# Patient Record
Sex: FEMALE | Race: OTHER
[De-identification: names, ages, dates, MRNs, and addresses within clinical notes are randomized per-mention and may not be internally consistent; named-entity substitution may affect disease eponyms.]

---

## 2017-04-24 ENCOUNTER — APPOINTMENT (OUTPATIENT)
Dept: RADIOLOGY | Facility: CLINIC | Age: 52
End: 2017-04-24

## 2017-04-24 ENCOUNTER — OUTPATIENT (OUTPATIENT)
Dept: OUTPATIENT SERVICES | Facility: HOSPITAL | Age: 52
LOS: 1 days | End: 2017-04-24
Payer: COMMERCIAL

## 2017-04-24 DIAGNOSIS — Z00.8 ENCOUNTER FOR OTHER GENERAL EXAMINATION: ICD-10-CM

## 2017-04-24 PROCEDURE — 77080 DXA BONE DENSITY AXIAL: CPT

## 2017-07-01 ENCOUNTER — OUTPATIENT (OUTPATIENT)
Dept: OUTPATIENT SERVICES | Facility: HOSPITAL | Age: 52
LOS: 1 days | End: 2017-07-01
Payer: MEDICAID

## 2017-07-01 PROCEDURE — G9001: CPT

## 2017-07-27 ENCOUNTER — INPATIENT (INPATIENT)
Facility: HOSPITAL | Age: 52
LOS: 3 days | Discharge: HOME | DRG: 316 | End: 2017-07-31
Attending: THORACIC SURGERY (CARDIOTHORACIC VASCULAR SURGERY) | Admitting: THORACIC SURGERY (CARDIOTHORACIC VASCULAR SURGERY)
Payer: COMMERCIAL

## 2017-07-27 VITALS
SYSTOLIC BLOOD PRESSURE: 139 MMHG | DIASTOLIC BLOOD PRESSURE: 91 MMHG | HEIGHT: 58 IN | WEIGHT: 104.94 LBS | TEMPERATURE: 98 F | RESPIRATION RATE: 17 BRPM | OXYGEN SATURATION: 97 % | HEART RATE: 88 BPM

## 2017-07-27 DIAGNOSIS — I31.3 PERICARDIAL EFFUSION (NONINFLAMMATORY): ICD-10-CM

## 2017-07-27 LAB
ALBUMIN SERPL ELPH-MCNC: 4 G/DL — SIGNIFICANT CHANGE UP (ref 3.3–5)
ALBUMIN SERPL ELPH-MCNC: 4.5 G/DL — SIGNIFICANT CHANGE UP (ref 3.3–5)
ALP SERPL-CCNC: 34 U/L — LOW (ref 40–120)
ALP SERPL-CCNC: 36 U/L — SIGNIFICANT CHANGE UP (ref 30–120)
ALT FLD-CCNC: 14 U/L RC — SIGNIFICANT CHANGE UP (ref 10–45)
ALT FLD-CCNC: 17 U/L DA — SIGNIFICANT CHANGE UP (ref 10–60)
ANION GAP SERPL CALC-SCNC: 10 MMOL/L — SIGNIFICANT CHANGE UP (ref 5–17)
ANION GAP SERPL CALC-SCNC: 14 MMOL/L — SIGNIFICANT CHANGE UP (ref 5–17)
APPEARANCE UR: CLEAR — SIGNIFICANT CHANGE UP
APPEARANCE UR: CLEAR — SIGNIFICANT CHANGE UP
APTT BLD: 32 SEC — SIGNIFICANT CHANGE UP (ref 27.5–37.4)
AST SERPL-CCNC: 23 U/L — SIGNIFICANT CHANGE UP (ref 10–40)
AST SERPL-CCNC: 24 U/L — SIGNIFICANT CHANGE UP (ref 10–40)
BASOPHILS # BLD AUTO: 0.1 K/UL — SIGNIFICANT CHANGE UP (ref 0–0.2)
BASOPHILS NFR BLD AUTO: 0.9 % — SIGNIFICANT CHANGE UP (ref 0–2)
BILIRUB SERPL-MCNC: 0.3 MG/DL — SIGNIFICANT CHANGE UP (ref 0.2–1.2)
BILIRUB SERPL-MCNC: 0.3 MG/DL — SIGNIFICANT CHANGE UP (ref 0.2–1.2)
BILIRUB UR-MCNC: NEGATIVE — SIGNIFICANT CHANGE UP
BILIRUB UR-MCNC: NEGATIVE — SIGNIFICANT CHANGE UP
BLD GP AB SCN SERPL QL: NEGATIVE — SIGNIFICANT CHANGE UP
BUN SERPL-MCNC: 20 MG/DL — SIGNIFICANT CHANGE UP (ref 7–23)
BUN SERPL-MCNC: 22 MG/DL — SIGNIFICANT CHANGE UP (ref 7–23)
CALCIUM SERPL-MCNC: 8.9 MG/DL — SIGNIFICANT CHANGE UP (ref 8.4–10.5)
CALCIUM SERPL-MCNC: 9.2 MG/DL — SIGNIFICANT CHANGE UP (ref 8.4–10.5)
CHLORIDE SERPL-SCNC: 106 MMOL/L — SIGNIFICANT CHANGE UP (ref 96–108)
CHLORIDE SERPL-SCNC: 107 MMOL/L — SIGNIFICANT CHANGE UP (ref 96–108)
CK MB CFR SERPL CALC: 0.8 NG/ML — SIGNIFICANT CHANGE UP (ref 0–3.6)
CK SERPL-CCNC: 73 U/L — SIGNIFICANT CHANGE UP (ref 26–192)
CO2 SERPL-SCNC: 23 MMOL/L — SIGNIFICANT CHANGE UP (ref 22–31)
CO2 SERPL-SCNC: 25 MMOL/L — SIGNIFICANT CHANGE UP (ref 22–31)
COLOR SPEC: COLORLESS — SIGNIFICANT CHANGE UP
COLOR SPEC: YELLOW — SIGNIFICANT CHANGE UP
CREAT SERPL-MCNC: 0.89 MG/DL — SIGNIFICANT CHANGE UP (ref 0.5–1.3)
CREAT SERPL-MCNC: 0.93 MG/DL — SIGNIFICANT CHANGE UP (ref 0.5–1.3)
DIFF PNL FLD: NEGATIVE — SIGNIFICANT CHANGE UP
DIFF PNL FLD: NEGATIVE — SIGNIFICANT CHANGE UP
EOSINOPHIL # BLD AUTO: 0.1 K/UL — SIGNIFICANT CHANGE UP (ref 0–0.5)
EOSINOPHIL NFR BLD AUTO: 1.9 % — SIGNIFICANT CHANGE UP (ref 0–6)
GLUCOSE SERPL-MCNC: 109 MG/DL — HIGH (ref 70–99)
GLUCOSE SERPL-MCNC: 90 MG/DL — SIGNIFICANT CHANGE UP (ref 70–99)
GLUCOSE UR QL: NEGATIVE MG/DL — SIGNIFICANT CHANGE UP
GLUCOSE UR QL: NEGATIVE — SIGNIFICANT CHANGE UP
HCT VFR BLD CALC: 34.3 % — LOW (ref 34.5–45)
HCT VFR BLD CALC: 35.1 % — SIGNIFICANT CHANGE UP (ref 34.5–45)
HGB BLD-MCNC: 10.8 G/DL — LOW (ref 11.5–15.5)
HGB BLD-MCNC: 11.3 G/DL — LOW (ref 11.5–15.5)
INR BLD: 1.14 RATIO — SIGNIFICANT CHANGE UP (ref 0.88–1.16)
INR BLD: 1.17 RATIO — HIGH (ref 0.88–1.16)
KETONES UR-MCNC: NEGATIVE — SIGNIFICANT CHANGE UP
KETONES UR-MCNC: NEGATIVE — SIGNIFICANT CHANGE UP
LEUKOCYTE ESTERASE UR-ACNC: ABNORMAL
LEUKOCYTE ESTERASE UR-ACNC: NEGATIVE — SIGNIFICANT CHANGE UP
LYMPHOCYTES # BLD AUTO: 2.1 K/UL — SIGNIFICANT CHANGE UP (ref 1–3.3)
LYMPHOCYTES # BLD AUTO: 35.7 % — SIGNIFICANT CHANGE UP (ref 13–44)
MCHC RBC-ENTMCNC: 25.8 PG — LOW (ref 27–34)
MCHC RBC-ENTMCNC: 28.1 PG — SIGNIFICANT CHANGE UP (ref 27–34)
MCHC RBC-ENTMCNC: 30.7 GM/DL — LOW (ref 32–36)
MCHC RBC-ENTMCNC: 32.9 GM/DL — SIGNIFICANT CHANGE UP (ref 32–36)
MCV RBC AUTO: 84.2 FL — SIGNIFICANT CHANGE UP (ref 80–100)
MCV RBC AUTO: 85.4 FL — SIGNIFICANT CHANGE UP (ref 80–100)
MONOCYTES # BLD AUTO: 0.4 K/UL — SIGNIFICANT CHANGE UP (ref 0–0.9)
MONOCYTES NFR BLD AUTO: 6.6 % — SIGNIFICANT CHANGE UP (ref 2–14)
NEUTROPHILS # BLD AUTO: 3.3 K/UL — SIGNIFICANT CHANGE UP (ref 1.8–7.4)
NEUTROPHILS NFR BLD AUTO: 54.9 % — SIGNIFICANT CHANGE UP (ref 43–77)
NITRITE UR-MCNC: NEGATIVE — SIGNIFICANT CHANGE UP
NITRITE UR-MCNC: NEGATIVE — SIGNIFICANT CHANGE UP
NT-PROBNP SERPL-SCNC: 24 PG/ML — SIGNIFICANT CHANGE UP (ref 0–300)
NT-PROBNP SERPL-SCNC: 29 PG/ML — SIGNIFICANT CHANGE UP (ref 0–125)
PA ADP PRP-ACNC: 234 PRU — SIGNIFICANT CHANGE UP (ref 194–417)
PH UR: 7 — SIGNIFICANT CHANGE UP (ref 5–8)
PH UR: 7 — SIGNIFICANT CHANGE UP (ref 5–8)
PLATELET # BLD AUTO: 281 K/UL — SIGNIFICANT CHANGE UP (ref 150–400)
PLATELET # BLD AUTO: 334 K/UL — SIGNIFICANT CHANGE UP (ref 150–400)
POTASSIUM SERPL-MCNC: 3.7 MMOL/L — SIGNIFICANT CHANGE UP (ref 3.5–5.3)
POTASSIUM SERPL-MCNC: 3.9 MMOL/L — SIGNIFICANT CHANGE UP (ref 3.5–5.3)
POTASSIUM SERPL-SCNC: 3.7 MMOL/L — SIGNIFICANT CHANGE UP (ref 3.5–5.3)
POTASSIUM SERPL-SCNC: 3.9 MMOL/L — SIGNIFICANT CHANGE UP (ref 3.5–5.3)
PROT SERPL-MCNC: 7.4 G/DL — SIGNIFICANT CHANGE UP (ref 6–8.3)
PROT SERPL-MCNC: 7.5 G/DL — SIGNIFICANT CHANGE UP (ref 6–8.3)
PROT UR-MCNC: NEGATIVE MG/DL — SIGNIFICANT CHANGE UP
PROT UR-MCNC: NEGATIVE — SIGNIFICANT CHANGE UP
PROTHROM AB SERPL-ACNC: 12.3 SEC — SIGNIFICANT CHANGE UP (ref 9.8–12.7)
PROTHROM AB SERPL-ACNC: 12.8 SEC — HIGH (ref 9.8–12.7)
RBC # BLD: 4.02 M/UL — SIGNIFICANT CHANGE UP (ref 3.8–5.2)
RBC # BLD: 4.17 M/UL — SIGNIFICANT CHANGE UP (ref 3.8–5.2)
RBC # FLD: 12.7 % — SIGNIFICANT CHANGE UP (ref 10.3–14.5)
RBC # FLD: 13.1 % — SIGNIFICANT CHANGE UP (ref 10.3–14.5)
RH IG SCN BLD-IMP: POSITIVE — SIGNIFICANT CHANGE UP
SODIUM SERPL-SCNC: 142 MMOL/L — SIGNIFICANT CHANGE UP (ref 135–145)
SODIUM SERPL-SCNC: 143 MMOL/L — SIGNIFICANT CHANGE UP (ref 135–145)
SP GR SPEC: 1 — LOW (ref 1.01–1.02)
SP GR SPEC: 1 — LOW (ref 1.01–1.02)
TROPONIN I SERPL-MCNC: 0 NG/ML — LOW (ref 0.02–0.06)
UROBILINOGEN FLD QL: NEGATIVE MG/DL — SIGNIFICANT CHANGE UP
UROBILINOGEN FLD QL: NEGATIVE — SIGNIFICANT CHANGE UP
WBC # BLD: 5.5 K/UL — SIGNIFICANT CHANGE UP (ref 3.8–10.5)
WBC # BLD: 5.9 K/UL — SIGNIFICANT CHANGE UP (ref 3.8–10.5)
WBC # FLD AUTO: 5.5 K/UL — SIGNIFICANT CHANGE UP (ref 3.8–10.5)
WBC # FLD AUTO: 5.9 K/UL — SIGNIFICANT CHANGE UP (ref 3.8–10.5)

## 2017-07-27 PROCEDURE — 71020: CPT | Mod: 26

## 2017-07-27 PROCEDURE — 71250 CT THORAX DX C-: CPT | Mod: 26

## 2017-07-27 PROCEDURE — 71010: CPT | Mod: 26,59

## 2017-07-27 RX ORDER — PANTOPRAZOLE SODIUM 20 MG/1
40 TABLET, DELAYED RELEASE ORAL
Qty: 0 | Refills: 0 | Status: DISCONTINUED | OUTPATIENT
Start: 2017-07-27 | End: 2017-07-31

## 2017-07-27 RX ORDER — SODIUM CHLORIDE 9 MG/ML
3 INJECTION INTRAMUSCULAR; INTRAVENOUS; SUBCUTANEOUS EVERY 8 HOURS
Qty: 0 | Refills: 0 | Status: DISCONTINUED | OUTPATIENT
Start: 2017-07-27 | End: 2017-07-31

## 2017-07-27 RX ADMIN — SODIUM CHLORIDE 3 MILLILITER(S): 9 INJECTION INTRAMUSCULAR; INTRAVENOUS; SUBCUTANEOUS at 23:13

## 2017-07-27 RX ADMIN — PANTOPRAZOLE SODIUM 40 MILLIGRAM(S): 20 TABLET, DELAYED RELEASE ORAL at 23:30

## 2017-07-27 NOTE — ED ADULT NURSE REASSESSMENT NOTE - NS ED NURSE REASSESS COMMENT FT1
1830- pt to be transferred to [place]. report given to rogelio gonzalez -CTU
1730-Pt resting quietly on stretcher with daughter bedside. Labs drawn & sent. EKG done

## 2017-07-27 NOTE — ED PROVIDER NOTE - PROGRESS NOTE DETAILS
chest xray Cardiac enlargement. The shape of the heart suggests the   presence of a pericardial effusion. Advise echocardiogram bedside sono done by Dr. Katie Thomas shows large pericardial effusion with collapsing of the RV indicative of cardiac tamponade. Dr. Manley wants pt transferred to [facility]. discussed case with Dr. Gerry Yanes from [place] [facility] surgery, recommend transfer to CTU bedside sono done by Dr. Katie Manley shows large pericardial effusion with collapsing of the RV indicative of cardiac tamponade. Dr. Manley wants pt transferred to [facility].

## 2017-07-27 NOTE — ED PROVIDER NOTE - OBJECTIVE STATEMENT
51 year old female with a PMhx of DM, HTN c/o lingering cough since July 8th. Pt states she was placed on Cefdinir by PMD 1 week ago.  Pt admits that the cough has not gotten any better.  She admits to a productive cough with clear mucus and SOB with exertion.  PT states she had a CT abd/pel done with her GI 1 month ago prior to the cough and fluid was noted in the lung.  She has been taking OTC cough suppressants with minimal relief.  Denies fever, chills, congestion, ear pain, sore throat, chest pain 51 year old female with a PMhx of DM, HTN c/o lingering cough since July 8th. Pt states she was placed on Cefdinir by PMD 1 week ago.  Pt admits that the cough has not gotten any better.  She admits to a productive cough with clear mucus and SOB with exertion.  States she is unable to walk down the street without becoming SOB.  PT states she had a CT abd/pel done with her GI 1 month ago prior to the cough and fluid was noted in the lung.  She has been taking OTC cough suppressants with minimal relief.  Denies fever, chills, congestion, ear pain, sore throat, chest pain

## 2017-07-27 NOTE — ED ADULT NURSE NOTE - PMH
Anxiety    Essential hypertension    Type 2 diabetes mellitus without complication, without long-term current use of insulin

## 2017-07-27 NOTE — ED ADULT TRIAGE NOTE - CHIEF COMPLAINT QUOTE
" She been coughing a lot for 2 weeks now. It is getting worse " Pt seen by PMD prescribed  cefdinir antibiotic

## 2017-07-27 NOTE — CONSULT NOTE ADULT - ASSESSMENT
50y/o from [facility]. Seen at [facility] ER. Daughter at bedside.  History HTN, borderline DM, high cholesterol, anxiety/depression, gastric reflux.  Sees GI who did a recent abdominal CAT scan that also revealed a pericardial effusion.    Seen starting about 7/8/17 with cough and clear sputum for which she was treated with antibiotics.   She has also had increasing shortness of breath with exertion, even at rest with lying down.  She denies recent foreign travel or fevers.  She does admit to some chest pain when she breathes in.    Preliminary STAT Echocardiogram compatible with huge pericardial effusion and some collapse of the RV.    Plan:  - Will emergently transfer the patient to [facility] with Dr. Gerry Yanes for further care.  - Diagnosis and prognosis fully discussed with patient and daughter.

## 2017-07-27 NOTE — ED ADULT NURSE NOTE - OBJECTIVE STATEMENT
Amb to ED with her daughter. Pt has had a cough for past 2 wks. Was treated with antibiotics but concerned because she still has a dry cough7 clogged ears. Color good. Skin warm & dry to touch. lungs clear

## 2017-07-27 NOTE — ED PROVIDER NOTE - CPE EDP HEAD NORM PED
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    Head atraumatic, normal cephalic shape.

## 2017-07-27 NOTE — ED PROVIDER NOTE - ATTENDING CONTRIBUTION TO CARE
50 yo female c/o progressive worsening shortness of breath x 2-3 weeks, worse on exertion, unable to walk more than 1 block without getting short of breath.  Lungs CTA b/l, RRR, xray shows cardiomegaly with pericardial effusion, ekg shows low voltage, Dr. Katie Manley consulted, echo ordered shows tamponade with RV collapse.  Discussed case with CT surgery Dr. Yanes, will transfer to [facility].

## 2017-07-27 NOTE — CONSULT NOTE ADULT - SUBJECTIVE AND OBJECTIVE BOX
CARDIOLOGY CONSULT NOTE    Patient is a 51y Female with a known history of :    HPI:  · HPI Objective Statement: 51 year old female with a PMhx of DM, HTN c/o lingering cough since July 8th. Pt states she was placed on Cefdinir by PMD 1 week ago.  Pt admits that the cough has not gotten any better.  She admits to a productive cough with clear mucus and SOB with exertion.  States she is unable to walk down the street without becoming SOB.  PT states she had a CT abd/pel done with her GI 1 month ago prior to the cough and fluid was noted in the lung.  She has been taking OTC cough suppressants with minimal relief.  Denies fever, chills, congestion, ear pain, sore throat, chest pain    REVIEW OF SYSTEMS:    CONSTITUTIONAL: No fever, weight loss, or fatigue  EYES: No eye pain, visual disturbances, or discharge  ENMT:  No difficulty hearing, tinnitus, vertigo; No sinus or throat pain  NECK: No pain or stiffness  BREASTS: No pain, masses, or nipple discharge  RESPIRATORY: No wheezing, chills or hemoptysis.  CARDIOVASCULAR: No palpitations, dizziness, or leg swelling  GASTROINTESTINAL: No abdominal or epigastric pain. No nausea, vomiting, or hematemesis; No diarrhea or constipation. No melena or hematochezia.  GENITOURINARY: No dysuria, frequency, hematuria, or incontinence  NEUROLOGICAL: No headaches, memory loss, loss of strength, numbness, or tremors  SKIN: No itching, burning, rashes, or lesions   LYMPH NODES: No enlarged glands  ENDOCRINE: No heat or cold intolerance; No hair loss  MUSCULOSKELETAL: No joint pain or swelling; No muscle, back, or extremity pain  PSYCHIATRIC: No depression, anxiety, mood swings, or difficulty sleeping  HEME/LYMPH: No easy bruising, or bleeding gums  ALLERGY AND IMMUNOLOGIC: No hives or eczema    MEDICATIONS  (STANDING):    MEDICATIONS  (PRN):      ALLERGIES: No Known Allergies      FAMILY HISTORY: None      Social History:  Alochol:   Smoking:   Drug Use:   Marital Status:     I&O's Detail      PHYSICAL EXAMINATION:  -----------------------------  T(C): 36.9 (07-27-17 @ 18:00), Max: 36.9 (07-27-17 @ 15:36)  HR: 78 (07-27-17 @ 18:00) (78 - 88)  BP: 136/98 (07-27-17 @ 18:00) (136/98 - 139/91)  RR: 16 (07-27-17 @ 18:00) (16 - 17)  SpO2: 96% (07-27-17 @ 18:00) (96% - 97%)  Wt(kg): --    Height (cm): 147.32 (07-27 @ 15:36)  Weight (kg): 47.6 (07-27 @ 15:36)  BMI (kg/m2): 21.9 (07-27 @ 15:36)  BSA (m2): 1.38 (07-27 @ 15:36)    Constitutional: well developed, normal appearance, well groomed, well nourished, no deformities and no acute distress.   Eyes: the conjunctiva exhibited no abnormalities and the eyelids demonstrated no xanthelasmas.   HEENT: normal oral mucosa, no oral pallor and no oral cyanosis.   Neck: normal jugular venous A waves present, normal jugular venous V waves present and no jugular venous naidu A waves.   Pulmonary: no respiratory distress, normal respiratory rhythm and effort, no accessory muscle use and lungs were clear to auscultation bilaterally.   Cardiovascular: heart rate and rhythm were normal, normal S1 and S2 and no murmur, gallop, rub, heave or thrill are present.   Abdomen: soft, non-tender, no hepato-splenomegaly and no abdominal mass palpated.   Musculoskeletal: the gait could not be assessed..   Extremities: no clubbing of the fingernails, no localized cyanosis, no petechial hemorrhages and no ischemic changes.   Skin: normal skin color and pigmentation, no rash, no venous stasis, no skin lesions, no skin ulcer and no xanthoma was observed.   Psychiatric: oriented to person, place, and time, the affect was normal, the mood was normal and not feeling anxious.     LABS:   --------  07-27    142  |  107  |  22  ----------------------------<  90  3.9   |  25  |  0.89    Ca    9.2      27 Jul 2017 17:55                           10.8   5.5   )-----------( 334      ( 27 Jul 2017 17:55 )             35.1         PO2 R/A-96%.            RADIOLOGY:  -----------------    < from: Xray Chest 2 Views PA/Lat (07.27.17 @ 16:51) >    EXAM:  CHEST PA & LAT                                  PROCEDURE DATE:  07/27/2017          INTERPRETATION:  Clinical information: Cough    2 views, frontal and lateral    Comparison study dated 8/31/2015.    Marked cardiac enlargement. The shape of the heart suggests the presence   of a pericardial effusion. Advise echocardiogram.      No lobar consolidation vascular congestion or effusion. Disc atelectasis   left upper lobe. Mediastinal contours intact. No acute osseous   abnormalities.      IMPRESSION: Cardiac enlargement. The shape of the heart suggests the   presence of a pericardial effusion. Advise echocardiogram.                  SHARON DENNISON M.D.,ATTENDING RADIOLOGIST  This document has been electronically signed. Jul 27 2017  4:50PM                < end of copied text >      ECG: NSR, ?low voltage.

## 2017-07-27 NOTE — ED PROVIDER NOTE - CHPI ED SYMPTOMS NEG
no decreased eating/drinking/no dizziness/no chills/no vomiting/no tingling/no numbness/no weakness/no nausea/no pain

## 2017-07-27 NOTE — ED PROVIDER NOTE - CRITICAL CARE PROVIDED
interpretation of diagnostic studies/additional history taking/documentation/direct patient care (not related to procedure)/consultation with other physicians/consult w/ pt's family directly relating to pts condition

## 2017-07-28 ENCOUNTER — RESULT REVIEW (OUTPATIENT)
Age: 52
End: 2017-07-28

## 2017-07-28 DIAGNOSIS — I31.3 PERICARDIAL EFFUSION (NONINFLAMMATORY): ICD-10-CM

## 2017-07-28 DIAGNOSIS — R69 ILLNESS, UNSPECIFIED: ICD-10-CM

## 2017-07-28 LAB
ALBUMIN FLD-MCNC: 4.2 G/DL — SIGNIFICANT CHANGE UP
B PERT IGG+IGM PNL SER: ABNORMAL
COLOR FLD: SIGNIFICANT CHANGE UP
EOSINOPHIL # FLD: 1 % — SIGNIFICANT CHANGE UP
FLUID INTAKE SUBSTANCE CLASS: SIGNIFICANT CHANGE UP
FLUID SEGMENTED GRANULOCYTES: 3 % — SIGNIFICANT CHANGE UP
GAS PNL BLDA: SIGNIFICANT CHANGE UP
GAS PNL BLDA: SIGNIFICANT CHANGE UP
GLUCOSE FLD-MCNC: 23 MG/DL — SIGNIFICANT CHANGE UP
HBA1C BLD-MCNC: 5.8 % — HIGH (ref 4–5.6)
LDH SERPL L TO P-CCNC: 265 U/L — SIGNIFICANT CHANGE UP
LYMPHOCYTES # FLD: 73 % — SIGNIFICANT CHANGE UP
MESOTHL CELL # FLD: 1 % — SIGNIFICANT CHANGE UP
MONOS+MACROS # FLD: 22 % — SIGNIFICANT CHANGE UP
MRSA PCR RESULT.: SIGNIFICANT CHANGE UP
NRBC # FLD: 1 % — HIGH (ref 0–0)
PH FLD: 7.5 — SIGNIFICANT CHANGE UP
PROT FLD-MCNC: 6.5 G/DL — SIGNIFICANT CHANGE UP
RCV VOL RI: HIGH /UL (ref 0–5)
S AUREUS DNA NOSE QL NAA+PROBE: SIGNIFICANT CHANGE UP
SPECIMEN SOURCE FLD: SIGNIFICANT CHANGE UP
T3 SERPL-MCNC: 112 NG/DL — SIGNIFICANT CHANGE UP (ref 80–200)
T4 AB SER-ACNC: 8.3 UG/DL — SIGNIFICANT CHANGE UP (ref 4.6–12)
TOTAL NUCLEATED CELL COUNT, BODY FLUID: 1778 /UL — HIGH (ref 0–5)
TSH SERPL-MCNC: 2.42 UIU/ML — SIGNIFICANT CHANGE UP (ref 0.27–4.2)
TUBE TYPE: SIGNIFICANT CHANGE UP

## 2017-07-28 PROCEDURE — 77012 CT SCAN FOR NEEDLE BIOPSY: CPT | Mod: 26

## 2017-07-28 PROCEDURE — 88341 IMHCHEM/IMCYTCHM EA ADD ANTB: CPT | Mod: 26

## 2017-07-28 PROCEDURE — 93010 ELECTROCARDIOGRAM REPORT: CPT

## 2017-07-28 PROCEDURE — 88112 CYTOPATH CELL ENHANCE TECH: CPT | Mod: 26

## 2017-07-28 PROCEDURE — 88305 TISSUE EXAM BY PATHOLOGIST: CPT | Mod: 26

## 2017-07-28 PROCEDURE — 33015: CPT

## 2017-07-28 PROCEDURE — 88342 IMHCHEM/IMCYTCHM 1ST ANTB: CPT | Mod: 26

## 2017-07-28 RX ORDER — SODIUM CHLORIDE 9 MG/ML
1000 INJECTION, SOLUTION INTRAVENOUS
Qty: 0 | Refills: 0 | Status: DISCONTINUED | OUTPATIENT
Start: 2017-07-28 | End: 2017-07-28

## 2017-07-28 RX ORDER — HYDROMORPHONE HYDROCHLORIDE 2 MG/ML
2 INJECTION INTRAMUSCULAR; INTRAVENOUS; SUBCUTANEOUS ONCE
Qty: 0 | Refills: 0 | Status: DISCONTINUED | OUTPATIENT
Start: 2017-07-28 | End: 2017-07-28

## 2017-07-28 RX ORDER — METFORMIN HYDROCHLORIDE 850 MG/1
1 TABLET ORAL
Qty: 0 | Refills: 0 | COMMUNITY

## 2017-07-28 RX ORDER — ALPRAZOLAM 0.25 MG
1 TABLET ORAL
Qty: 0 | Refills: 0 | COMMUNITY

## 2017-07-28 RX ORDER — FENOFIBRATE,MICRONIZED 130 MG
1 CAPSULE ORAL
Qty: 0 | Refills: 0 | COMMUNITY

## 2017-07-28 RX ORDER — PANTOPRAZOLE SODIUM 20 MG/1
1 TABLET, DELAYED RELEASE ORAL
Qty: 0 | Refills: 0 | COMMUNITY

## 2017-07-28 RX ORDER — ACETAMINOPHEN 500 MG
850 TABLET ORAL ONCE
Qty: 0 | Refills: 0 | Status: COMPLETED | OUTPATIENT
Start: 2017-07-28 | End: 2017-07-28

## 2017-07-28 RX ADMIN — HYDROMORPHONE HYDROCHLORIDE 2 MILLIGRAM(S): 2 INJECTION INTRAMUSCULAR; INTRAVENOUS; SUBCUTANEOUS at 20:48

## 2017-07-28 RX ADMIN — Medication 850 MILLIGRAM(S): at 23:49

## 2017-07-28 RX ADMIN — SODIUM CHLORIDE 3 MILLILITER(S): 9 INJECTION INTRAMUSCULAR; INTRAVENOUS; SUBCUTANEOUS at 21:59

## 2017-07-28 RX ADMIN — SODIUM CHLORIDE 3 MILLILITER(S): 9 INJECTION INTRAMUSCULAR; INTRAVENOUS; SUBCUTANEOUS at 14:04

## 2017-07-28 RX ADMIN — HYDROMORPHONE HYDROCHLORIDE 2 MILLIGRAM(S): 2 INJECTION INTRAMUSCULAR; INTRAVENOUS; SUBCUTANEOUS at 19:57

## 2017-07-28 RX ADMIN — SODIUM CHLORIDE 3 MILLILITER(S): 9 INJECTION INTRAMUSCULAR; INTRAVENOUS; SUBCUTANEOUS at 05:33

## 2017-07-28 RX ADMIN — Medication 340 MILLIGRAM(S): at 23:10

## 2017-07-28 NOTE — PROCEDURE NOTE - NSPROCDETAILS_GEN_ALL_CORE
connected to a pressurized flush line/all materials/supplies accounted for at end of procedure/location identified, draped/prepped, sterile technique used, needle inserted/introduced/sutured in place

## 2017-07-28 NOTE — H&P ADULT - PROBLEM SELECTOR PLAN 1
- monitor hemodynamics  - place arterial line, admit labs, cxr, ekg, ABG  - CT chest  - IR consult for possible drainage in AM

## 2017-07-28 NOTE — H&P ADULT - PMH
Anxiety    Essential hypertension    Gastroesophageal reflux disease, esophagitis presence not specified    Type 2 diabetes mellitus without complication, without long-term current use of insulin

## 2017-07-28 NOTE — H&P ADULT - GASTROINTESTINAL DETAILS
no masses palpable/no guarding/no rebound tenderness/soft/bowel sounds normal/no distention/no rigidity

## 2017-07-28 NOTE — PROGRESS NOTE ADULT - SUBJECTIVE AND OBJECTIVE BOX
VITAL SIGNS    Telemetry:  NSR 90    Vital Signs Last 24 Hrs  T(C): 36.9 (17 @ 12:00), Max: 36.9 (17 @ 15:36)  T(F): 98.4 (17 @ 12:00), Max: 98.5 (17 @ 15:36)  HR: 85 (17 @ 14:00) (78 - 101)  BP: 137/92 (17 @ 13:00) (133/92 - 164/100)  RR: 16 (17 @ 14:00) (16 - 34)  SpO2: 100% (17 @ 14:00) (94% - 100%)                    @ 07:01  -   @ 07:00  --------------------------------------------------------  IN: 0 mL / OUT: 500 mL / NET: -500 mL     @ 07:01  -   @ 15:07  --------------------------------------------------------  IN: 200 mL / OUT: 350 mL / NET: -150 mL          Daily Height in cm: 147.32 (2017 15:36)    Daily Weight in k.6 (2017 00:00)        CAPILLARY BLOOD GLUCOSE  109 (2017 00:00)  109 (2017 21:00)                  Coumadin    [ ] YES          x[  ]      NO         Reason:                               PHYSICAL EXAM        Neurology: alert and oriented x 3, nonfocal, no gross deficits    CV :  S1 S2 , RRR     Sternal Wound :  no    Lungs: cta    Abdomen: soft, nontender, nondistended, positive bowel sounds,    :           Voiding    Extremities:        - edema, negative calve tenderness,          sodium chloride 0.9% lock flush 3 milliLiter(s) IV Push every 8 hours  pantoprazole    Tablet 40 milliGRAM(s) Oral before breakfast  dextrose 5% + sodium chloride 0.9%. 1000 milliLiter(s) IV Continuous <Continuous>                              Physical Therapy Rec:   Home  [  ]   Home w/ PT  [  ]  Rehab  [  ]    Discussed with Cardiothoracic Team at AM rounds.

## 2017-07-28 NOTE — H&P ADULT - HISTORY OF PRESENT ILLNESS
51 year old female PMH of DM2, HTN transferred from [facility] where she was found to have a pericardial effusion on TTE. Pt c/o dry cough with occasional clear/white sputum x 3 weeks, fatigue and decreased exercise tolerance, also c/o chronic epigastric pain. Pt states she was placed on Cefdinir by PMD 1 week ago with no improvement in symptoms. Pt denies any fever, chills, N/V/D, recent travel, diaphoresis, orthopnea, paroxysmal nocturnal dyspnea. Admitted to CTU, currently hemodynamically stable, no complaints.

## 2017-07-28 NOTE — PROGRESS NOTE ADULT - SUBJECTIVE AND OBJECTIVE BOX
51 year old female PMH of DM2, HTN transferred from [facility] where she was found to have a pericardial effusion on TTE presented to IR for pericardial effusion drainage.     Allergies:No Known Allergies      PAST MEDICAL & SURGICAL HISTORY:  Gastroesophageal reflux disease, esophagitis presence not specified  Anxiety  Type 2 diabetes mellitus without complication, without long-term current use of insulin  Essential hypertension  No significant past surgical history        Pertinent labs:                      11.3   5.9   )-----------( 281      ( 27 Jul 2017 20:58 )             34.3   07-27    143  |  106  |  20  ----------------------------<  109<H>  3.7   |  23  |  0.93    Ca    8.9      27 Jul 2017 20:58    TPro  7.4  /  Alb  4.5  /  TBili  0.3  /  DBili  x   /  AST  23  /  ALT  14  /  AlkPhos  34<L>  07-27  PT/INR - ( 27 Jul 2017 20:58 )   PT: 12.3 sec;   INR: 1.14 ratio         PTT - ( 27 Jul 2017 20:58 )  PTT:32.0 sec    Consent: Procedure/risks/ Benefits explained. Informed consent obtained. Pt verbalizes understanding. 51 year old female PMH of DM2, HTN transferred from [facility] where she was found to have a pericardial effusion on TTE presented to IR for pericardial effusion drainage.     Allergies:No Known Allergies      PAST MEDICAL & SURGICAL HISTORY:  Gastroesophageal reflux disease, esophagitis presence not specified  Anxiety  Type 2 diabetes mellitus without complication, without long-term current use of insulin  Essential hypertension  No significant past surgical history        Pertinent labs:                      11.3   5.9   )-----------( 281      ( 27 Jul 2017 20:58 )             34.3   07-27    143  |  106  |  20  ----------------------------<  109<H>  3.7   |  23  |  0.93    Ca    8.9      27 Jul 2017 20:58    TPro  7.4  /  Alb  4.5  /  TBili  0.3  /  DBili  x   /  AST  23  /  ALT  14  /  AlkPhos  34<L>  07-27  PT/INR - ( 27 Jul 2017 20:58 )   PT: 12.3 sec;   INR: 1.14 ratio         PTT - ( 27 Jul 2017 20:58 )  PTT:32.0 sec    Pt refused  phone. Daughter translated for pt.  Consent: Procedure/risks/ Benefits explained. Informed consent obtained. Pt verbalizes understanding. 51 year old female PMH of DM2, HTN transferred from [facility] where she was found to have a pericardial effusion on TTE presented to IR for pericardial effusion drainage. Reports cough and SOB for 3 weeks and chest pain.    Allergies:No Known Allergies      PAST MEDICAL & SURGICAL HISTORY:  Gastroesophageal reflux disease, esophagitis presence not specified  Anxiety  Type 2 diabetes mellitus without complication, without long-term current use of insulin  Essential hypertension  No significant past surgical history        Pertinent labs:                      11.3   5.9   )-----------( 281      ( 27 Jul 2017 20:58 )             34.3   07-27    143  |  106  |  20  ----------------------------<  109<H>  3.7   |  23  |  0.93    Ca    8.9      27 Jul 2017 20:58    TPro  7.4  /  Alb  4.5  /  TBili  0.3  /  DBili  x   /  AST  23  /  ALT  14  /  AlkPhos  34<L>  07-27  PT/INR - ( 27 Jul 2017 20:58 )   PT: 12.3 sec;   INR: 1.14 ratio         PTT - ( 27 Jul 2017 20:58 )  PTT:32.0 sec    Pt refused  phone. Daughter translated for pt.  Consent: Procedure/risks/ Benefits explained. Informed consent obtained. Pt verbalizes understanding.

## 2017-07-28 NOTE — H&P ADULT - ASSESSMENT
52 y/o F presents from [facility] with reported pericardial effusion on TTE, c/o dry cough, hemodynamically stable.

## 2017-07-28 NOTE — PROGRESS NOTE ADULT - ASSESSMENT
51 year old female PMH of DM2, HTN transferred from [facility] where she was found to have a pericardial effusion on TTE. Pt c/o dry cough with occasional clear/white sputum x 3 weeks, fatigue and decreased exercise tolerance, also c/o chronic epigastric pain. Pt states she was placed on Cefdinir by PMD 1 week ago with no improvement in symptoms. . Admitted to CTU,  hemodynamically stable, no complaints.A line placed for monitoring  7/28 transferred to sdu  The pt has an duran.  She is awaiting drainage of a pericardial effusion in IR

## 2017-07-28 NOTE — PROGRESS NOTE ADULT - SUBJECTIVE AND OBJECTIVE BOX
Interventional Radiology Brief- Operative Note    Procedure: CT guided pericardial drainage    Operators: Joel    Anesthesia (type): 2% lidocaine local    Contrast: none    EBL: none    Findings/Follow up Plan of Care:CT guided pericardial drainage performed and insertion of an 8 Fr drainage catheter. Appx 1170cc dark red fluid drained. Drain connected to Pleur Evac.    Specimens Removed: samples sent for micro/cyto/chemostry.    Implants: 8 Fr drain    Complications: none    Condition/Disposition: stable / room    Please call Interventional Radiology x [phone] with any questions, concerns, or issues.

## 2017-07-29 LAB
ALBUMIN SERPL ELPH-MCNC: 4.4 G/DL — SIGNIFICANT CHANGE UP (ref 3.3–5)
ALP SERPL-CCNC: 35 U/L — LOW (ref 40–120)
ALT FLD-CCNC: 12 U/L RC — SIGNIFICANT CHANGE UP (ref 10–45)
ANA TITR SER: NEGATIVE — SIGNIFICANT CHANGE UP
ANION GAP SERPL CALC-SCNC: 17 MMOL/L — SIGNIFICANT CHANGE UP (ref 5–17)
AST SERPL-CCNC: 26 U/L — SIGNIFICANT CHANGE UP (ref 10–40)
BASOPHILS # BLD AUTO: 0 K/UL — SIGNIFICANT CHANGE UP (ref 0–0.2)
BASOPHILS NFR BLD AUTO: 0.2 % — SIGNIFICANT CHANGE UP (ref 0–2)
BILIRUB SERPL-MCNC: 0.6 MG/DL — SIGNIFICANT CHANGE UP (ref 0.2–1.2)
BUN SERPL-MCNC: 15 MG/DL — SIGNIFICANT CHANGE UP (ref 7–23)
CALCIUM SERPL-MCNC: 9 MG/DL — SIGNIFICANT CHANGE UP (ref 8.4–10.5)
CHLORIDE SERPL-SCNC: 106 MMOL/L — SIGNIFICANT CHANGE UP (ref 96–108)
CO2 SERPL-SCNC: 17 MMOL/L — LOW (ref 22–31)
CREAT SERPL-MCNC: 0.79 MG/DL — SIGNIFICANT CHANGE UP (ref 0.5–1.3)
EOSINOPHIL # BLD AUTO: 0 K/UL — SIGNIFICANT CHANGE UP (ref 0–0.5)
EOSINOPHIL NFR BLD AUTO: 0.1 % — SIGNIFICANT CHANGE UP (ref 0–6)
GLUCOSE SERPL-MCNC: 112 MG/DL — HIGH (ref 70–99)
GRAM STN FLD: SIGNIFICANT CHANGE UP
HCT VFR BLD CALC: 40.5 % — SIGNIFICANT CHANGE UP (ref 34.5–45)
HGB BLD-MCNC: 12.9 G/DL — SIGNIFICANT CHANGE UP (ref 11.5–15.5)
LYMPHOCYTES # BLD AUTO: 1.1 K/UL — SIGNIFICANT CHANGE UP (ref 1–3.3)
LYMPHOCYTES # BLD AUTO: 9.1 % — LOW (ref 13–44)
MCHC RBC-ENTMCNC: 26.9 PG — LOW (ref 27–34)
MCHC RBC-ENTMCNC: 31.7 GM/DL — LOW (ref 32–36)
MCV RBC AUTO: 84.8 FL — SIGNIFICANT CHANGE UP (ref 80–100)
MONOCYTES # BLD AUTO: 0.7 K/UL — SIGNIFICANT CHANGE UP (ref 0–0.9)
MONOCYTES NFR BLD AUTO: 5.9 % — SIGNIFICANT CHANGE UP (ref 2–14)
NEUTROPHILS # BLD AUTO: 9.9 K/UL — HIGH (ref 1.8–7.4)
NEUTROPHILS NFR BLD AUTO: 84.7 % — HIGH (ref 43–77)
NIGHT BLUE STAIN TISS: SIGNIFICANT CHANGE UP
PLATELET # BLD AUTO: 346 K/UL — SIGNIFICANT CHANGE UP (ref 150–400)
POTASSIUM SERPL-MCNC: 4 MMOL/L — SIGNIFICANT CHANGE UP (ref 3.5–5.3)
POTASSIUM SERPL-SCNC: 4 MMOL/L — SIGNIFICANT CHANGE UP (ref 3.5–5.3)
PROT SERPL-MCNC: 7.7 G/DL — SIGNIFICANT CHANGE UP (ref 6–8.3)
RBC # BLD: 4.77 M/UL — SIGNIFICANT CHANGE UP (ref 3.8–5.2)
RBC # FLD: 12.7 % — SIGNIFICANT CHANGE UP (ref 10.3–14.5)
SODIUM SERPL-SCNC: 140 MMOL/L — SIGNIFICANT CHANGE UP (ref 135–145)
SPECIMEN SOURCE: SIGNIFICANT CHANGE UP
SPECIMEN SOURCE: SIGNIFICANT CHANGE UP
WBC # BLD: 11.7 K/UL — HIGH (ref 3.8–10.5)
WBC # FLD AUTO: 11.7 K/UL — HIGH (ref 3.8–10.5)

## 2017-07-29 PROCEDURE — 93306 TTE W/DOPPLER COMPLETE: CPT | Mod: 26

## 2017-07-29 RX ORDER — COLCHICINE 0.6 MG
0.6 TABLET ORAL
Qty: 0 | Refills: 0 | Status: DISCONTINUED | OUTPATIENT
Start: 2017-07-29 | End: 2017-07-31

## 2017-07-29 RX ORDER — ALPRAZOLAM 0.25 MG
0.25 TABLET ORAL AT BEDTIME
Qty: 0 | Refills: 0 | Status: DISCONTINUED | OUTPATIENT
Start: 2017-07-29 | End: 2017-07-30

## 2017-07-29 RX ORDER — ENOXAPARIN SODIUM 100 MG/ML
40 INJECTION SUBCUTANEOUS DAILY
Qty: 0 | Refills: 0 | Status: DISCONTINUED | OUTPATIENT
Start: 2017-07-29 | End: 2017-07-31

## 2017-07-29 RX ORDER — ACETAMINOPHEN 500 MG
650 TABLET ORAL EVERY 6 HOURS
Qty: 0 | Refills: 0 | Status: DISCONTINUED | OUTPATIENT
Start: 2017-07-29 | End: 2017-07-31

## 2017-07-29 RX ORDER — ATORVASTATIN CALCIUM 80 MG/1
10 TABLET, FILM COATED ORAL AT BEDTIME
Qty: 0 | Refills: 0 | Status: DISCONTINUED | OUTPATIENT
Start: 2017-07-29 | End: 2017-07-31

## 2017-07-29 RX ADMIN — SODIUM CHLORIDE 3 MILLILITER(S): 9 INJECTION INTRAMUSCULAR; INTRAVENOUS; SUBCUTANEOUS at 21:15

## 2017-07-29 RX ADMIN — SODIUM CHLORIDE 3 MILLILITER(S): 9 INJECTION INTRAMUSCULAR; INTRAVENOUS; SUBCUTANEOUS at 15:10

## 2017-07-29 RX ADMIN — Medication 0.6 MILLIGRAM(S): at 21:17

## 2017-07-29 RX ADMIN — Medication 0.6 MILLIGRAM(S): at 11:50

## 2017-07-29 RX ADMIN — ATORVASTATIN CALCIUM 10 MILLIGRAM(S): 80 TABLET, FILM COATED ORAL at 21:17

## 2017-07-29 RX ADMIN — PANTOPRAZOLE SODIUM 40 MILLIGRAM(S): 20 TABLET, DELAYED RELEASE ORAL at 06:35

## 2017-07-29 RX ADMIN — SODIUM CHLORIDE 3 MILLILITER(S): 9 INJECTION INTRAMUSCULAR; INTRAVENOUS; SUBCUTANEOUS at 06:38

## 2017-07-29 RX ADMIN — Medication 650 MILLIGRAM(S): at 19:41

## 2017-07-29 RX ADMIN — Medication 650 MILLIGRAM(S): at 20:15

## 2017-07-29 NOTE — PROGRESS NOTE ADULT - SUBJECTIVE AND OBJECTIVE BOX
VITAL SIGNS    Telemetry:  NSR / ST      Vital Signs Last 24 Hrs  T(C): 36.5 (17 @ 13:00), Max: 37.1 (17 @ 22:18)  T(F): 97.7 (17 @ 13:00), Max: 98.8 (17 @ 22:18)  HR: 94 (17 @ :00) (86 - 112)  BP: 121/76 (17 @ 13:00) (118/83 - 156/93)  RR: 18 (17 @ 13:00) (18 - 18)  SpO2: 100% (17 @ 13:00) (96% - 100%)                @ 07:  -   @ 07:00  --------------------------------------------------------  IN: 700 mL / OUT: 1320 mL / NET: -620 mL     @ 07:  -   @ 18:37  --------------------------------------------------------  IN: 420 mL / OUT: 0 mL / NET: 420 mL        Daily Weight in k.9 (2017 06:42)                Drains:   Left anterior chest pericardial drain --> WS; no air leak                       PHYSICAL EXAM        Neurology: alert and oriented x 3, nonfocal, no gross deficits    CV : (+) S1 and S2, No murmurs, rubs, gallops or clicks     Sternal Wound :  CDI , Stable    Lungs: cTA B/L     Abdomen: soft, nontender, nondistended, positive bowel sounds    : Voiding           Extremities: B/L LE negative for edema; Negative calf tenderness; (+) 2 DP palpable            sodium chloride 0.9% lock flush 3 milliLiter(s) IV Push every 8 hours  pantoprazole    Tablet 40 milliGRAM(s) Oral before breakfast  colchicine 0.6 milliGRAM(s) Oral two times a day      Physical Therapy Rec:   Home  [ X ]   Home w/ PT  [  ]  Rehab  [  ]    Discussed with Cardiothoracic Team at AM rounds.

## 2017-07-29 NOTE — PROGRESS NOTE ADULT - ASSESSMENT
51 year old female PMH of DM2, HTN transferred from [facility] where she was found to have a pericardial effusion on TTE. Pt c/o dry cough with occasional clear/white sputum x 3 weeks, fatigue and decreased exercise tolerance, also c/o chronic epigastric pain. Pt states she was placed on Cefdinir by PMD 1 week ago with no improvement in symptoms. . Admitted to CTU,  hemodynamically stable, no complaints. A line placed for monitoring  7/28 transferred to SDU  7/28 Left pigtail cath placed in IR--> drained 1.2 Liters  7/29 TTE today; Started on Colchicine 0.6 mg po BID for pericardial effusion

## 2017-07-30 LAB
ANION GAP SERPL CALC-SCNC: 14 MMOL/L — SIGNIFICANT CHANGE UP (ref 5–17)
BUN SERPL-MCNC: 19 MG/DL — SIGNIFICANT CHANGE UP (ref 7–23)
CALCIUM SERPL-MCNC: 8.6 MG/DL — SIGNIFICANT CHANGE UP (ref 8.4–10.5)
CHLORIDE SERPL-SCNC: 107 MMOL/L — SIGNIFICANT CHANGE UP (ref 96–108)
CO2 SERPL-SCNC: 20 MMOL/L — LOW (ref 22–31)
CREAT SERPL-MCNC: 0.75 MG/DL — SIGNIFICANT CHANGE UP (ref 0.5–1.3)
GLUCOSE SERPL-MCNC: 115 MG/DL — HIGH (ref 70–99)
HCT VFR BLD CALC: 41.3 % — SIGNIFICANT CHANGE UP (ref 34.5–45)
HGB BLD-MCNC: 12.7 G/DL — SIGNIFICANT CHANGE UP (ref 11.5–15.5)
MCHC RBC-ENTMCNC: 26.1 PG — LOW (ref 27–34)
MCHC RBC-ENTMCNC: 30.9 GM/DL — LOW (ref 32–36)
MCV RBC AUTO: 84.7 FL — SIGNIFICANT CHANGE UP (ref 80–100)
PLATELET # BLD AUTO: 343 K/UL — SIGNIFICANT CHANGE UP (ref 150–400)
POTASSIUM SERPL-MCNC: 4 MMOL/L — SIGNIFICANT CHANGE UP (ref 3.5–5.3)
POTASSIUM SERPL-SCNC: 4 MMOL/L — SIGNIFICANT CHANGE UP (ref 3.5–5.3)
RBC # BLD: 4.87 M/UL — SIGNIFICANT CHANGE UP (ref 3.8–5.2)
RBC # FLD: 12.7 % — SIGNIFICANT CHANGE UP (ref 10.3–14.5)
SODIUM SERPL-SCNC: 141 MMOL/L — SIGNIFICANT CHANGE UP (ref 135–145)
WBC # BLD: 8.7 K/UL — SIGNIFICANT CHANGE UP (ref 3.8–10.5)
WBC # FLD AUTO: 8.7 K/UL — SIGNIFICANT CHANGE UP (ref 3.8–10.5)

## 2017-07-30 RX ORDER — ALPRAZOLAM 0.25 MG
0.5 TABLET ORAL
Qty: 0 | Refills: 0 | Status: DISCONTINUED | OUTPATIENT
Start: 2017-07-30 | End: 2017-07-31

## 2017-07-30 RX ADMIN — SODIUM CHLORIDE 3 MILLILITER(S): 9 INJECTION INTRAMUSCULAR; INTRAVENOUS; SUBCUTANEOUS at 14:37

## 2017-07-30 RX ADMIN — PANTOPRAZOLE SODIUM 40 MILLIGRAM(S): 20 TABLET, DELAYED RELEASE ORAL at 05:24

## 2017-07-30 RX ADMIN — ENOXAPARIN SODIUM 40 MILLIGRAM(S): 100 INJECTION SUBCUTANEOUS at 11:30

## 2017-07-30 RX ADMIN — Medication 0.6 MILLIGRAM(S): at 17:39

## 2017-07-30 RX ADMIN — SODIUM CHLORIDE 3 MILLILITER(S): 9 INJECTION INTRAMUSCULAR; INTRAVENOUS; SUBCUTANEOUS at 05:23

## 2017-07-30 RX ADMIN — SODIUM CHLORIDE 3 MILLILITER(S): 9 INJECTION INTRAMUSCULAR; INTRAVENOUS; SUBCUTANEOUS at 21:06

## 2017-07-30 RX ADMIN — ATORVASTATIN CALCIUM 10 MILLIGRAM(S): 80 TABLET, FILM COATED ORAL at 21:06

## 2017-07-30 RX ADMIN — Medication 0.6 MILLIGRAM(S): at 05:24

## 2017-07-30 RX ADMIN — Medication 0.5 MILLIGRAM(S): at 21:06

## 2017-07-30 NOTE — PROGRESS NOTE ADULT - SUBJECTIVE AND OBJECTIVE BOX
VITAL SIGNS-Tele:S"R     T(C): 36.7 (07-30-17 @ 14:00), Max: 37 (07-29-17 @ 20:35)  BP: 116/77 (07-30-17 @ 14:00) (116/77 - 126/82)  SpO2: 97% (07-30-17 @ 14:00) (96% - 97%)         07-29 @ 07:01  -  07-30 @ 07:00  --------------------------------------------------------  IN: 1020 mL / OUT: 725 mL / NET: 295 mL    07-30 @ 07:01  -  07-30 @ 15:12  --------------------------------------------------------  IN: 420 mL / OUT: 400 mL / NET: 20 mL             Drains:     MS         [x  ] Drainage:                                                Isolated  [  ]  :       PHYSICAL EXAM:  Neurology: alert and oriented x 3, nonfocal, no gross deficits  CV : S1S2  Sternal Wound :  CDI , Stable  Lungs: Cta  Abdomen: soft, nontender, nondistended, positive bowel sounds, last bowel movement         Extremities:     no edema no calf tenderness    sodium chloride 0.9% lock flush 3 milliLiter(s) IV Push every 8 hours  pantoprazole    Tablet 40 milliGRAM(s) Oral before breakfast  colchicine 0.6 milliGRAM(s) Oral two times a day  atorvastatin 10 milliGRAM(s) Oral at bedtime  ALPRAZolam 0.25 milliGRAM(s) Oral at bedtime PRN  enoxaparin Injectable 40 milliGRAM(s) SubCutaneous daily  acetaminophen   Tablet 650 milliGRAM(s) Oral every 6 hours PRN  acetaminophen   Tablet. 650 milliGRAM(s) Oral every 6 hours PRN      Physical Therapy Rec:   Home  [  x]   Home w/ PT  [  ]  Rehab  [  ]  Discussed with Cardiothoracic Team at AM rounds.

## 2017-07-30 NOTE — PROGRESS NOTE ADULT - ASSESSMENT
51 year old female PMH of DM2, HTN transferred from [facility] where she was found to have a pericardial effusion on TTE. Pt c/o dry cough with occasional clear/white sputum x 3 weeks, fatigue and decreased exercise tolerance, also c/o chronic epigastric pain. Pt states she was placed on Cefdinir by PMD 1 week ago with no improvement in symptoms. . Admitted to CTU,  hemodynamically stable, no complaints. A line placed for monitoring  7/28 transferred to SDU  7/28 Left pigtail cath placed in IR--> drained 1.2 Liters  7/29 TTE today; Started on Colchicine 0.6 mg po BID for pericardial effusion    7/30 - maintain drain- d/c in am monday - call IR

## 2017-07-31 ENCOUNTER — TRANSCRIPTION ENCOUNTER (OUTPATIENT)
Age: 52
End: 2017-07-31

## 2017-07-31 VITALS
OXYGEN SATURATION: 100 % | SYSTOLIC BLOOD PRESSURE: 116 MMHG | DIASTOLIC BLOOD PRESSURE: 78 MMHG | HEART RATE: 98 BPM | TEMPERATURE: 98 F | RESPIRATION RATE: 18 BRPM

## 2017-07-31 LAB
ANION GAP SERPL CALC-SCNC: 17 MMOL/L — SIGNIFICANT CHANGE UP (ref 5–17)
BUN SERPL-MCNC: 23 MG/DL — SIGNIFICANT CHANGE UP (ref 7–23)
CALCIUM SERPL-MCNC: 8.9 MG/DL — SIGNIFICANT CHANGE UP (ref 8.4–10.5)
CHLORIDE SERPL-SCNC: 103 MMOL/L — SIGNIFICANT CHANGE UP (ref 96–108)
CO2 SERPL-SCNC: 19 MMOL/L — LOW (ref 22–31)
CREAT SERPL-MCNC: 0.72 MG/DL — SIGNIFICANT CHANGE UP (ref 0.5–1.3)
GLUCOSE SERPL-MCNC: 120 MG/DL — HIGH (ref 70–99)
HCT VFR BLD CALC: 42.1 % — SIGNIFICANT CHANGE UP (ref 34.5–45)
HGB BLD-MCNC: 13.3 G/DL — SIGNIFICANT CHANGE UP (ref 11.5–15.5)
MCHC RBC-ENTMCNC: 26.7 PG — LOW (ref 27–34)
MCHC RBC-ENTMCNC: 31.7 GM/DL — LOW (ref 32–36)
MCV RBC AUTO: 84.3 FL — SIGNIFICANT CHANGE UP (ref 80–100)
PLATELET # BLD AUTO: 363 K/UL — SIGNIFICANT CHANGE UP (ref 150–400)
POTASSIUM SERPL-MCNC: 3.8 MMOL/L — SIGNIFICANT CHANGE UP (ref 3.5–5.3)
POTASSIUM SERPL-SCNC: 3.8 MMOL/L — SIGNIFICANT CHANGE UP (ref 3.5–5.3)
RBC # BLD: 4.99 M/UL — SIGNIFICANT CHANGE UP (ref 3.8–5.2)
RBC # FLD: 12.8 % — SIGNIFICANT CHANGE UP (ref 10.3–14.5)
SODIUM SERPL-SCNC: 139 MMOL/L — SIGNIFICANT CHANGE UP (ref 135–145)
WBC # BLD: 8.7 K/UL — SIGNIFICANT CHANGE UP (ref 3.8–10.5)
WBC # FLD AUTO: 8.7 K/UL — SIGNIFICANT CHANGE UP (ref 3.8–10.5)

## 2017-07-31 PROCEDURE — 82947 ASSAY GLUCOSE BLOOD QUANT: CPT

## 2017-07-31 PROCEDURE — 71250 CT THORAX DX C-: CPT

## 2017-07-31 PROCEDURE — C1894: CPT

## 2017-07-31 PROCEDURE — 85730 THROMBOPLASTIN TIME PARTIAL: CPT

## 2017-07-31 PROCEDURE — 80048 BASIC METABOLIC PNL TOTAL CA: CPT

## 2017-07-31 PROCEDURE — 84157 ASSAY OF PROTEIN OTHER: CPT

## 2017-07-31 PROCEDURE — 99231 SBSQ HOSP IP/OBS SF/LOW 25: CPT

## 2017-07-31 PROCEDURE — 88112 CYTOPATH CELL ENHANCE TECH: CPT

## 2017-07-31 PROCEDURE — 81001 URINALYSIS AUTO W/SCOPE: CPT

## 2017-07-31 PROCEDURE — 83880 ASSAY OF NATRIURETIC PEPTIDE: CPT

## 2017-07-31 PROCEDURE — 85610 PROTHROMBIN TIME: CPT

## 2017-07-31 PROCEDURE — 33015: CPT

## 2017-07-31 PROCEDURE — 84295 ASSAY OF SERUM SODIUM: CPT

## 2017-07-31 PROCEDURE — 83036 HEMOGLOBIN GLYCOSYLATED A1C: CPT

## 2017-07-31 PROCEDURE — 89051 BODY FLUID CELL COUNT: CPT

## 2017-07-31 PROCEDURE — 85027 COMPLETE CBC AUTOMATED: CPT

## 2017-07-31 PROCEDURE — 80053 COMPREHEN METABOLIC PANEL: CPT

## 2017-07-31 PROCEDURE — C1769: CPT

## 2017-07-31 PROCEDURE — 84132 ASSAY OF SERUM POTASSIUM: CPT

## 2017-07-31 PROCEDURE — 87015 SPECIMEN INFECT AGNT CONCNTJ: CPT

## 2017-07-31 PROCEDURE — 84436 ASSAY OF TOTAL THYROXINE: CPT

## 2017-07-31 PROCEDURE — 82553 CREATINE MB FRACTION: CPT

## 2017-07-31 PROCEDURE — 88342 IMHCHEM/IMCYTCHM 1ST ANTB: CPT

## 2017-07-31 PROCEDURE — 71046 X-RAY EXAM CHEST 2 VIEWS: CPT

## 2017-07-31 PROCEDURE — 88305 TISSUE EXAM BY PATHOLOGIST: CPT

## 2017-07-31 PROCEDURE — 88341 IMHCHEM/IMCYTCHM EA ADD ANTB: CPT

## 2017-07-31 PROCEDURE — 83615 LACTATE (LD) (LDH) ENZYME: CPT

## 2017-07-31 PROCEDURE — 82330 ASSAY OF CALCIUM: CPT

## 2017-07-31 PROCEDURE — 93306 TTE W/DOPPLER COMPLETE: CPT

## 2017-07-31 PROCEDURE — 84443 ASSAY THYROID STIM HORMONE: CPT

## 2017-07-31 PROCEDURE — 84480 ASSAY TRIIODOTHYRONINE (T3): CPT

## 2017-07-31 PROCEDURE — 87075 CULTR BACTERIA EXCEPT BLOOD: CPT

## 2017-07-31 PROCEDURE — 71010: CPT | Mod: 26,77

## 2017-07-31 PROCEDURE — 82435 ASSAY OF BLOOD CHLORIDE: CPT

## 2017-07-31 PROCEDURE — 71045 X-RAY EXAM CHEST 1 VIEW: CPT

## 2017-07-31 PROCEDURE — 82803 BLOOD GASES ANY COMBINATION: CPT

## 2017-07-31 PROCEDURE — 83605 ASSAY OF LACTIC ACID: CPT

## 2017-07-31 PROCEDURE — 77012 CT SCAN FOR NEEDLE BIOPSY: CPT

## 2017-07-31 PROCEDURE — 87640 STAPH A DNA AMP PROBE: CPT

## 2017-07-31 PROCEDURE — 82042 OTHER SOURCE ALBUMIN QUAN EA: CPT

## 2017-07-31 PROCEDURE — 87641 MR-STAPH DNA AMP PROBE: CPT

## 2017-07-31 PROCEDURE — 86901 BLOOD TYPING SEROLOGIC RH(D): CPT

## 2017-07-31 PROCEDURE — 87070 CULTURE OTHR SPECIMN AEROBIC: CPT

## 2017-07-31 PROCEDURE — 86900 BLOOD TYPING SEROLOGIC ABO: CPT

## 2017-07-31 PROCEDURE — 87205 SMEAR GRAM STAIN: CPT

## 2017-07-31 PROCEDURE — 81003 URINALYSIS AUTO W/O SCOPE: CPT

## 2017-07-31 PROCEDURE — C1729: CPT

## 2017-07-31 PROCEDURE — 82550 ASSAY OF CK (CPK): CPT

## 2017-07-31 PROCEDURE — 85014 HEMATOCRIT: CPT

## 2017-07-31 PROCEDURE — 82945 GLUCOSE OTHER FLUID: CPT

## 2017-07-31 PROCEDURE — 87206 SMEAR FLUORESCENT/ACID STAI: CPT

## 2017-07-31 PROCEDURE — 86850 RBC ANTIBODY SCREEN: CPT

## 2017-07-31 PROCEDURE — 85576 BLOOD PLATELET AGGREGATION: CPT

## 2017-07-31 PROCEDURE — 83986 ASSAY PH BODY FLUID NOS: CPT

## 2017-07-31 PROCEDURE — 93005 ELECTROCARDIOGRAM TRACING: CPT

## 2017-07-31 PROCEDURE — 71010: CPT | Mod: 26

## 2017-07-31 PROCEDURE — 86038 ANTINUCLEAR ANTIBODIES: CPT

## 2017-07-31 PROCEDURE — 87116 MYCOBACTERIA CULTURE: CPT

## 2017-07-31 PROCEDURE — 84484 ASSAY OF TROPONIN QUANT: CPT

## 2017-07-31 RX ORDER — ACETAMINOPHEN 500 MG
2 TABLET ORAL
Qty: 0 | Refills: 0 | COMMUNITY
Start: 2017-07-31

## 2017-07-31 RX ORDER — SODIUM CHLORIDE 0.65 %
0 AEROSOL, SPRAY (ML) NASAL
Qty: 0 | Refills: 0 | COMMUNITY
Start: 2017-07-31

## 2017-07-31 RX ORDER — SODIUM CHLORIDE 0.65 %
1 AEROSOL, SPRAY (ML) NASAL
Qty: 0 | Refills: 0 | Status: DISCONTINUED | OUTPATIENT
Start: 2017-07-31 | End: 2017-07-31

## 2017-07-31 RX ORDER — COLCHICINE 0.6 MG
1 TABLET ORAL
Qty: 28 | Refills: 0 | OUTPATIENT
Start: 2017-07-31 | End: 2017-08-14

## 2017-07-31 RX ORDER — POTASSIUM CHLORIDE 20 MEQ
20 PACKET (EA) ORAL ONCE
Qty: 0 | Refills: 0 | Status: COMPLETED | OUTPATIENT
Start: 2017-07-31 | End: 2017-07-31

## 2017-07-31 RX ORDER — CEFDINIR 250 MG/5ML
1 POWDER, FOR SUSPENSION ORAL
Qty: 0 | Refills: 0 | COMMUNITY

## 2017-07-31 RX ADMIN — Medication 1 SPRAY(S): at 13:35

## 2017-07-31 RX ADMIN — Medication 20 MILLIEQUIVALENT(S): at 09:47

## 2017-07-31 RX ADMIN — SODIUM CHLORIDE 3 MILLILITER(S): 9 INJECTION INTRAMUSCULAR; INTRAVENOUS; SUBCUTANEOUS at 05:27

## 2017-07-31 RX ADMIN — Medication 1 SPRAY(S): at 17:58

## 2017-07-31 RX ADMIN — SODIUM CHLORIDE 3 MILLILITER(S): 9 INJECTION INTRAMUSCULAR; INTRAVENOUS; SUBCUTANEOUS at 13:37

## 2017-07-31 RX ADMIN — Medication 0.6 MILLIGRAM(S): at 05:29

## 2017-07-31 RX ADMIN — ENOXAPARIN SODIUM 40 MILLIGRAM(S): 100 INJECTION SUBCUTANEOUS at 11:37

## 2017-07-31 RX ADMIN — Medication 0.6 MILLIGRAM(S): at 17:58

## 2017-07-31 RX ADMIN — PANTOPRAZOLE SODIUM 40 MILLIGRAM(S): 20 TABLET, DELAYED RELEASE ORAL at 05:28

## 2017-07-31 NOTE — DISCHARGE NOTE ADULT - HOSPITAL COURSE
Admit to CTU for large Pericardial effusion    7/28 Left pigtail cath placed in IR--> drained 1.2 Liters  7/29 TTE   no raimundo effusion; Started on Colchicine 0.6 mg po BID for pericardial effusion    7/30 - maintain drain-   7/31  No drainage.  tube d/c Admit to CTU for large Pericardial effusion    7/28 Left pigtail cath placed in IR--> drained 1.2 Liters  7/29 TTE   no raimundo effusion; Started on Colchicine 0.6 mg po BID for pericardial effusion    7/30 - maintain drain-   7/31  No drainage.  tube d/c   by IR    d/c home

## 2017-07-31 NOTE — DISCHARGE NOTE ADULT - CARE PROVIDER_API CALL
Gerry Yanes), Surgery; Thoracic and Cardiac Surgery  [address] [place] [address]  Phone: [phone]  Fax: [phone]

## 2017-07-31 NOTE — PROGRESS NOTE ADULT - ASSESSMENT
51 year old female PMH of DM2, HTN transfer from [facility],  found to have a pericardial effusion on TTE. Pt c/o dry cough with occasional clear/white sputum x 3 weeks, fatigue and decreased exercise tolerance , states she was placed on Cefdinir by PMD 1 week ago with no improvement in symptoms. . Admitted to CTU,  hemodynamically stable.    7/28 Left pigtail cath placed in IR--> drained 1.2 Liters  7/29 TTE today; Started on Colchicine 0.6 mg po BID for pericardial effusion    7/30 - maintain drain  7/31   no drainage from raimundo drain

## 2017-07-31 NOTE — DISCHARGE NOTE ADULT - PATIENT PORTAL LINK FT
“You can access the [employer] Patient Portal, offered by [facility], by registering with the following website: [email or web address]”

## 2017-07-31 NOTE — DISCHARGE NOTE ADULT - CARE PLAN
Principal Discharge DX:	Pericardial effusion  Goal:	no sob   no chest  pain  Instructions for follow-up, activity and diet:	remove dressing tuesday   and may shower

## 2017-07-31 NOTE — DISCHARGE NOTE ADULT - MEDICATION SUMMARY - MEDICATIONS TO TAKE
I will START or STAY ON the medications listed below when I get home from the hospital:    acetaminophen 325 mg oral tablet  -- 2 tab(s) by mouth every 6 hours, As needed, Mild Pain (1 - 3)  -- Indication: For Pain    metFORMIN 500 mg oral tablet, extended release  -- 1 tab(s) by mouth once a day  -- Indication: For diabetes    colchicine 0.6 mg oral tablet  -- 1 tab(s) by mouth 2 times a day   hold for loose stool  -- Indication: For Pericardial effusion    fenofibrate  -- 1 tab(s) by mouth once a day  -- Indication: For cholesterol    pravastatin 20 mg oral tablet  -- 1 tab(s) by mouth once a day  -- Indication: For cholesterol    Xanax 0.5 mg oral tablet  -- 1 tab(s) by mouth 3 times a day, As Needed  -- Indication: For Anxiety    sodium chloride 0.65% nasal spray  --  into nose , As Needed  congestion  -- Indication: For Nasal congestion   OTC    pantoprazole 40 mg oral delayed release tablet  -- 1 tab(s) by mouth once a day  -- Indication: For GI

## 2017-07-31 NOTE — DISCHARGE NOTE ADULT - MEDICATION SUMMARY - MEDICATIONS TO STOP TAKING
I will STOP taking the medications listed below when I get home from the hospital:    cefdinir 300 mg oral capsule  -- 1 cap(s) by mouth every 12 hours

## 2017-07-31 NOTE — PROGRESS NOTE ADULT - SUBJECTIVE AND OBJECTIVE BOX
Interventional Radiology Follow- Up Note      This is a 51y Female with h/o pericardial effusion s/p drainage catheter placement on 7/28 in Interventional Radiology with Dr. Maldonado. IR was contacted to remove catheter as pt has had not documented output x48hrs. echo on 7/29 demonstrated no effusion and CXR 7/31 shows resolution of pericardial effusion.     Pt seen and examined at bedside. Site was prepped with chlorhexidine and pericardial drain was removed. no active bleeding/ hematoma was noted and sterile occlusive dressing was applied with hemostat gauze and tegaderm. pt tolerated procedure well. Denies SOB, chest pain.       PAST MEDICAL & SURGICAL HISTORY:  Gastroesophageal reflux disease, esophagitis presence not specified  Anxiety  Type 2 diabetes mellitus without complication, without long-term current use of insulin  Essential hypertension  No significant past surgical history      Allergies:  No Known Allergies      LABS:                        13.3   8.7   )-----------( 363      ( 31 Jul 2017 05:19 )             42.1     07-31    139  |  103  |  23  ----------------------------<  120<H>  3.8   |  19<L>  |  0.72    Ca    8.9      31 Jul 2017 05:19        I&O's Detail    30 Jul 2017 07:01  -  31 Jul 2017 07:00  --------------------------------------------------------  IN:    Oral Fluid: 660 mL  Total IN: 660 mL    OUT:    Voided: 1900 mL  Total OUT: 1900 mL    Total NET: -1240 mL      31 Jul 2017 07:01  -  31 Jul 2017 17:01  --------------------------------------------------------  IN:    Oral Fluid: 660 mL  Total IN: 660 mL    OUT:    Voided: 400 mL  Total OUT: 400 mL    Total NET: 260 mL      Vitals: T(F): 97.9 (07-31-17 @ 14:01), Max: 98.6 (07-30-17 @ 19:54)  HR: 98 (07-31-17 @ 14:01) (85 - 100)  BP: 116/78 (07-31-17 @ 14:01) (116/78 - 138/89)  RR: 18 (07-31-17 @ 14:01) (18 - 18)  SpO2: 100% (07-31-17 @ 14:01) (96% - 100%)  Wt(kg): --    PHYSICAL EXAM:  Gen: NAD, A&Ox3  Chest: see above      A/P: 51y Female s/p pericardial drain removal at bedside  -STAT CXR   -discussed case with NP caring for pt    -Discussed with IR attending  -D/C planning per primary team  -Monitor site for active bleeding/ hematoma and vitals q15min x1hr and every 30min x1hr.     Please call IR at extension [phone] with any questions, concerns, or issues regarding above.

## 2017-07-31 NOTE — DISCHARGE NOTE ADULT - NS AS ACTIVITY OBS
Showering allowed/Walking-Outdoors allowed/Driving allowed/No Heavy lifting/straining/Walking-Indoors allowed Driving allowed/Sex allowed/No Heavy lifting/straining/Showering allowed/Walking-Outdoors allowed/Walking-Indoors allowed/Stairs allowed

## 2017-07-31 NOTE — PROGRESS NOTE ADULT - SUBJECTIVE AND OBJECTIVE BOX
VITAL SIGNS    Telemetry:    NSR   90    Vital Signs Last 24 Hrs  T(C): 36.7 (07-31-17 @ 05:00), Max: 37 (07-30-17 @ 19:54)  T(F): 98 (07-31-17 @ 05:00), Max: 98.6 (07-30-17 @ 19:54)  HR: 85 (07-31-17 @ 05:00) (85 - 100)  BP: 138/89 (07-31-17 @ 05:00) (116/77 - 138/89)  RR: 18 (07-31-17 @ 05:00) (18 - 18)  SpO2: 96% (07-31-17 @ 05:00) (96% - 98%)             07-30 @ 07:01  -  07-31 @ 07:00  --------------------------------------------------------  IN: 660 mL / OUT: 1900 mL / NET: -1240 mL    07-31 @ 07:01  -  07-31 @ 10:36  --------------------------------------------------------  IN: 240 mL / OUT: 400 mL / NET: -160 mL          Drains:   pericardial drain  no drainage                      PHYSICAL EXAM    Neurology: alert and oriented x 3, nonfocal, no gross deficits    CV :  RRR     1 raimundo drain intact  Lungs: B/L CTA    Abdomen: soft, nontender, nondistended, positive bowel sounds, last bowel movement 7/30      Extremities:     No edema   no calf tenfderness                                      Physical Therapy Rec:   Home  [  ]   Home w/ PT  [  ]  Rehab  [  ]    Discussed with Cardiothoracic Team at AM rounds.

## 2017-07-31 NOTE — PROGRESS NOTE ADULT - PROBLEM SELECTOR PLAN 1
1. Maintain pericardial drain --> WS  2. TTE today   3. Start Colchicine 0.6 mg PO BID   4. Increase activity as tolerated   5. Discharged plan home once medically cleared   6. Plan of care discussed with Attending.
1. Maintain pericardial drain -IR to d/c drain in am monday 7/31  3. Start Colchicine 0.6 mg PO BID   4. Increase activity as tolerated   5. Discharged plan home once medically cleared   6. Plan of care discussed with Attending.
d/c drain  today  Colchicine 0.6 mg PO BID  hopful home today
Drainage in IR today

## 2017-07-31 NOTE — DISCHARGE NOTE ADULT - ADDITIONAL INSTRUCTIONS
follow up with your cardiologist in 1- 2 weeks follow up with your cardiologist in 1- 2 weeks,    as well as your family doctor  1-2 weeks

## 2017-08-02 LAB
CULTURE RESULTS: SIGNIFICANT CHANGE UP
SPECIMEN SOURCE: SIGNIFICANT CHANGE UP

## 2017-08-03 LAB — NON-GYNECOLOGICAL CYTOLOGY STUDY: SIGNIFICANT CHANGE UP

## 2017-09-16 LAB
CULTURE RESULTS: SIGNIFICANT CHANGE UP
SPECIMEN SOURCE: SIGNIFICANT CHANGE UP

## 2020-07-06 PROBLEM — F41.9 ANXIETY DISORDER, UNSPECIFIED: Chronic | Status: ACTIVE | Noted: 2017-07-27

## 2020-07-06 PROBLEM — I10 ESSENTIAL (PRIMARY) HYPERTENSION: Chronic | Status: ACTIVE | Noted: 2017-07-27

## 2020-07-06 PROBLEM — K21.9 GASTRO-ESOPHAGEAL REFLUX DISEASE WITHOUT ESOPHAGITIS: Chronic | Status: ACTIVE | Noted: 2017-07-28

## 2020-07-06 PROBLEM — E11.9 TYPE 2 DIABETES MELLITUS WITHOUT COMPLICATIONS: Chronic | Status: ACTIVE | Noted: 2017-07-27

## 2020-07-27 ENCOUNTER — APPOINTMENT (OUTPATIENT)
Dept: PULMONOLOGY | Facility: CLINIC | Age: 55
End: 2020-07-27

## 2020-08-10 ENCOUNTER — APPOINTMENT (OUTPATIENT)
Age: 55
End: 2020-08-10

## 2020-09-28 ENCOUNTER — RESULT REVIEW (OUTPATIENT)
Age: 55
End: 2020-09-28

## 2021-12-15 NOTE — ED ADULT NURSE NOTE - NS ED NURSE LEVEL OF CONSCIOUSNESS MENTAL STATUS
1. Have you had increased asthma symptoms (chest tightness, increased cough,         wheezing or felt short of breath) in the past week? No    2. Have you had a marked increase in allergy symptoms(itchy eyes or nose, sneezing, runny nose, post nasal drip or throat clearing) in the past week? No    3. Do you have a cold, respiratory tract infection, or flu-like symptoms? No    4. Did you have any problems such as increased allergy or asthma symptoms, hives or generalized itching within 12 hours of receiving your allergy injection or swelling that persisted into the next day? Yes, states that after last injection, that Left arm (MV site) stayed red and swollen until Friday, and that it remained irritated until Monday. Discussed with Dr. Herzog, and was informed to repeat last dose.    5. Are you on any new medications such as eye drops, blood pressure or migraine medication?  No      6. Patient was seen by allergist in the last 12 months?  Yes    7. Are you taking your allergy medicine as prescribed? Yes    8. Do you have your Epi kit with you? Yes    9. Epi expiration date: 03/2023          Alert/Cooperative/Awake

## 2022-01-06 ENCOUNTER — OFFICE VISIT (OUTPATIENT)
Dept: URBAN - METROPOLITAN AREA CLINIC 54 | Facility: CLINIC | Age: 57
End: 2022-01-06

## 2022-01-06 ENCOUNTER — OFFICE VISIT (OUTPATIENT)
Dept: URBAN - METROPOLITAN AREA CLINIC 54 | Facility: CLINIC | Age: 57
End: 2022-01-06
Payer: COMMERCIAL

## 2022-01-06 DIAGNOSIS — Z86.010 HISTORY OF COLON POLYPS: ICD-10-CM

## 2022-01-06 DIAGNOSIS — R19.7 ACUTE DIARRHEA: ICD-10-CM

## 2022-01-06 PROCEDURE — 99204 OFFICE O/P NEW MOD 45 MIN: CPT | Performed by: STUDENT IN AN ORGANIZED HEALTH CARE EDUCATION/TRAINING PROGRAM

## 2022-01-06 PROCEDURE — 99244 OFF/OP CNSLTJ NEW/EST MOD 40: CPT | Performed by: STUDENT IN AN ORGANIZED HEALTH CARE EDUCATION/TRAINING PROGRAM

## 2022-01-06 RX ORDER — PANTOPRAZOLE SODIUM 40 MG/1
1 TABLET TABLET, DELAYED RELEASE ORAL ONCE A DAY
Status: ACTIVE | COMMUNITY

## 2022-01-06 RX ORDER — AMLODIPINE BESYLATE 5 MG/1
1 TABLET TABLET ORAL ONCE A DAY
COMMUNITY

## 2022-01-06 RX ORDER — METFORMIN HYDROCHLORIDE 500 MG/1
1 TABLET WITH A MEAL TABLET, FILM COATED ORAL ONCE A DAY
COMMUNITY

## 2022-01-06 RX ORDER — PANTOPRAZOLE SODIUM 40 MG/1
1 TABLET TABLET, DELAYED RELEASE ORAL ONCE A DAY
COMMUNITY

## 2022-01-06 RX ORDER — FENOFIBRATE 145 MG/1
1 TABLET TABLET, FILM COATED ORAL ONCE A DAY
COMMUNITY

## 2022-01-06 RX ORDER — AMLODIPINE BESYLATE 5 MG/1
1 TABLET TABLET ORAL ONCE A DAY
Status: ACTIVE | COMMUNITY

## 2022-01-06 RX ORDER — PRAVASTATIN SODIUM 40 MG/1
1 TABLET TABLET ORAL ONCE A DAY
Status: ACTIVE | COMMUNITY

## 2022-01-06 RX ORDER — PRAVASTATIN SODIUM 40 MG/1
1 TABLET TABLET ORAL ONCE A DAY
COMMUNITY

## 2022-01-06 RX ORDER — POLYETHYLENE GLYCOL-3350 AND ELECTROLYTES WITH FLAVOR PACK 240; 5.84; 2.98; 6.72; 22.72 G/278.26G; G/278.26G; G/278.26G; G/278.26G; G/278.26G
AS DIRECTED POWDER, FOR SOLUTION ORAL AS DIRECTED
Qty: 1 PACKAGE | Refills: 0 | OUTPATIENT
Start: 2022-01-06 | End: 2022-01-08

## 2022-01-06 RX ORDER — VITAMIN A 2400 MCG
1 TABLET CAPSULE ORAL ONCE A DAY
Status: ON HOLD | COMMUNITY

## 2022-01-06 RX ORDER — METFORMIN HYDROCHLORIDE 500 MG/1
1 TABLET WITH A MEAL TABLET, FILM COATED ORAL ONCE A DAY
Status: ACTIVE | COMMUNITY

## 2022-01-06 RX ORDER — VITAMIN A 2400 MCG
1 TABLET CAPSULE ORAL ONCE A DAY
COMMUNITY

## 2022-01-06 RX ORDER — FENOFIBRATE 145 MG/1
1 TABLET TABLET, FILM COATED ORAL ONCE A DAY
Status: ACTIVE | COMMUNITY

## 2022-01-06 RX ORDER — FLUTICASONE PROPIONATE 50 UG/1
1 SPRAY IN EACH NOSTRIL SPRAY, METERED NASAL ONCE A DAY
COMMUNITY

## 2022-01-06 NOTE — HPI-TODAY'S VISIT:
Ms. Nader Powell is a 56-year-old woman referred by Shaina Jara MD for abdominal bloating and diarrhea. Her past medical history is significant for type 2 diabetes, vitamin D deficiency, osteoporosis, GERD, VAUGHN.

## 2022-01-06 NOTE — EXAM-FUNCTIONAL ASSESSMENT
General: Well appearing. Not in any distress. Cardiovascular: Normal S1 S2 heart sounds without murmur, no edema Respiratory: Breathing comfortably without conversational dyspnea Abdomen:   Epigastric and RUQ tenderness RLQ tenderness. Tympanic to perscussion Rectal: Deferred Skin: without visible rashes on examined areas. Musculoskeletal: ambulated without difficulty. Can stand from sitting position without assistance. Neuro: No focal deficits. Alert and oriented. Psych: Appropriate mood and affect.

## 2022-01-06 NOTE — HPI-TODAY'S VISIT:
Mr. Nader Powell is a 56-year-old woman referred by Shaina Jara for chronic diarrhea. Recent CBC without anemia.   Onset: 3 years ago. Aggravating factors: Food Frequency of stools: 2-3 x/day Stool consistency: watery and solid Associate with bloating/gas Baseline bowel habits prior to onset:  once a day, solid.  Had EGD and colon in 2017 in [place] for GERD: EGD- was told she had gastritis.  Colonocopy: she thinks 1 polyp was found. They told her to come back in 5 years.  Family HIstory of GI cancer: Unknown Family History of IBD: unknown Family History of Autoimmune: unknown   Family History: Diabetes No dysphagia, Nausea, vomiting.  Endorses history of heart burn which improves with PPI. Returns when she discontinues PPI.

## 2022-01-21 ENCOUNTER — OFFICE VISIT (OUTPATIENT)
Dept: URBAN - METROPOLITAN AREA SURGERY CENTER 14 | Facility: SURGERY CENTER | Age: 57
End: 2022-01-21
Payer: COMMERCIAL

## 2022-01-21 ENCOUNTER — CLAIMS CREATED FROM THE CLAIM WINDOW (OUTPATIENT)
Dept: URBAN - METROPOLITAN AREA CLINIC 4 | Facility: CLINIC | Age: 57
End: 2022-01-21
Payer: COMMERCIAL

## 2022-01-21 DIAGNOSIS — K63.89 PNEUMATOSIS OF INTESTINES: ICD-10-CM

## 2022-01-21 DIAGNOSIS — K52.9 CHRONIC DIARRHEA: ICD-10-CM

## 2022-01-21 PROCEDURE — 45380 COLONOSCOPY AND BIOPSY: CPT | Performed by: STUDENT IN AN ORGANIZED HEALTH CARE EDUCATION/TRAINING PROGRAM

## 2022-01-21 PROCEDURE — 88313 SPECIAL STAINS GROUP 2: CPT | Performed by: PATHOLOGY

## 2022-01-21 PROCEDURE — 88305 TISSUE EXAM BY PATHOLOGIST: CPT | Performed by: PATHOLOGY

## 2022-01-21 PROCEDURE — G8907 PT DOC NO EVENTS ON DISCHARG: HCPCS | Performed by: STUDENT IN AN ORGANIZED HEALTH CARE EDUCATION/TRAINING PROGRAM

## 2022-01-21 PROCEDURE — 88342 IMHCHEM/IMCYTCHM 1ST ANTB: CPT | Performed by: PATHOLOGY

## 2022-02-03 ENCOUNTER — OFFICE VISIT (OUTPATIENT)
Dept: URBAN - METROPOLITAN AREA CLINIC 54 | Facility: CLINIC | Age: 57
End: 2022-02-03

## 2022-02-03 RX ORDER — AMLODIPINE BESYLATE 5 MG/1
1 TABLET TABLET ORAL ONCE A DAY
COMMUNITY

## 2022-02-03 RX ORDER — METFORMIN HYDROCHLORIDE 500 MG/1
1 TABLET WITH A MEAL TABLET, FILM COATED ORAL ONCE A DAY
COMMUNITY

## 2022-02-03 RX ORDER — FENOFIBRATE 145 MG/1
1 TABLET TABLET, FILM COATED ORAL ONCE A DAY
COMMUNITY

## 2022-02-03 RX ORDER — VITAMIN A 2400 MCG
1 TABLET CAPSULE ORAL ONCE A DAY
COMMUNITY

## 2022-02-03 RX ORDER — PANTOPRAZOLE SODIUM 40 MG/1
1 TABLET TABLET, DELAYED RELEASE ORAL ONCE A DAY
COMMUNITY

## 2022-02-03 RX ORDER — PRAVASTATIN SODIUM 40 MG/1
1 TABLET TABLET ORAL ONCE A DAY
COMMUNITY

## 2022-02-10 ENCOUNTER — OFFICE VISIT (OUTPATIENT)
Dept: URBAN - METROPOLITAN AREA CLINIC 54 | Facility: CLINIC | Age: 57
End: 2022-02-10
Payer: COMMERCIAL

## 2022-02-10 ENCOUNTER — WEB ENCOUNTER (OUTPATIENT)
Dept: URBAN - METROPOLITAN AREA CLINIC 54 | Facility: CLINIC | Age: 57
End: 2022-02-10

## 2022-02-10 DIAGNOSIS — K52.9 CHRONIC DIARRHEA: ICD-10-CM

## 2022-02-10 PROBLEM — 236071009: Status: ACTIVE | Noted: 2022-02-10

## 2022-02-10 PROCEDURE — 99213 OFFICE O/P EST LOW 20 MIN: CPT | Performed by: STUDENT IN AN ORGANIZED HEALTH CARE EDUCATION/TRAINING PROGRAM

## 2022-02-10 RX ORDER — METFORMIN HYDROCHLORIDE 500 MG/1
1 TABLET WITH A MEAL TABLET, FILM COATED ORAL ONCE A DAY
COMMUNITY

## 2022-02-10 RX ORDER — AMLODIPINE BESYLATE 5 MG/1
1 TABLET TABLET ORAL ONCE A DAY
COMMUNITY

## 2022-02-10 RX ORDER — PRAVASTATIN SODIUM 40 MG/1
1 TABLET TABLET ORAL ONCE A DAY
COMMUNITY

## 2022-02-10 RX ORDER — VITAMIN A 2400 MCG
1 TABLET CAPSULE ORAL ONCE A DAY
COMMUNITY

## 2022-02-10 RX ORDER — PANTOPRAZOLE SODIUM 40 MG/1
1 TABLET TABLET, DELAYED RELEASE ORAL ONCE A DAY
COMMUNITY

## 2022-02-10 RX ORDER — FENOFIBRATE 145 MG/1
1 TABLET TABLET, FILM COATED ORAL ONCE A DAY
COMMUNITY

## 2022-02-10 NOTE — HPI-TODAY'S VISIT:
Ms. Nader Powell is a 56-year-old female who presents for follow-up for abdominal bloating and diarrhea. Patient underwent colonoscopy which was grossly unremarkable.  Random colonic biopsies were without abnormality. Patient has not yet performed stool studies.   Still having diarrhea, 4 bowel movements yesterday. Very foul smelling.  Endorses heartburn, which is somewhat improved with pantoprazole which she takes twice a day. Denies fecal incontinence.

## 2022-02-10 NOTE — EXAM-PHYSICAL EXAM
General: Well appearing. No acute distress. Cardiovascular: Without lower extremity edema Respiratory: Breathing comfortably without conversational dyspnea Abdomen:  non-distended, soft, non-tender Rectal: Deferred Skin: without visible rashes on examined areas. Musculoskeletal: ambulated without difficulty. Can stand from sitting position without assistance. Neuro: No focal deficits. Alert and oriented. Psych: Appropriate mood and affect.

## 2022-02-11 ENCOUNTER — TELEPHONE ENCOUNTER (OUTPATIENT)
Dept: URBAN - METROPOLITAN AREA CLINIC 54 | Facility: CLINIC | Age: 57
End: 2022-02-11

## 2022-02-13 LAB
IMMUNOGLOBULIN A, QN, SERUM: 136
T-TRANSGLUTAMINASE (TTG) IGA: <2

## 2022-02-18 LAB
ADENOVIRUS F 40/41: NOT DETECTED
ASTROVIRUS: NOT DETECTED
C DIFFICILE TOXIN A/B: NOT DETECTED
CAMPYLOBACTER: DETECTED
CRYPTOSPORIDIUM: NOT DETECTED
CYCLOSPORA CAYETANENSIS: NOT DETECTED
E COLI O157: (no result)
ENTAMOEBA HISTOLYTICA: NOT DETECTED
ENTEROAGGREGATIVE E COLI: NOT DETECTED
ENTEROPATHOGENIC E COLI: NOT DETECTED
ENTEROTOXIGENIC E COLI: NOT DETECTED
GIARDIA LAMBLIA: NOT DETECTED
NOROVIRUS GI/GII: NOT DETECTED
PANCREATIC ELASTASE, FECAL: >500
PLESIOMONAS SHIGELLOIDES: NOT DETECTED
ROTAVIRUS A: NOT DETECTED
SALMONELLA: NOT DETECTED
SAPOVIRUS: NOT DETECTED
SHIGA-TOXIN-PRODUCING E COLI: NOT DETECTED
SHIGELLA/ENTEROINVASIVE E COLI: NOT DETECTED
VIBRIO CHOLERAE: NOT DETECTED
VIBRIO: NOT DETECTED
YERSINIA ENTEROCOLITICA: NOT DETECTED

## 2022-02-22 ENCOUNTER — TELEPHONE ENCOUNTER (OUTPATIENT)
Dept: URBAN - METROPOLITAN AREA CLINIC 54 | Facility: CLINIC | Age: 57
End: 2022-02-22

## 2022-02-22 RX ORDER — METFORMIN HYDROCHLORIDE 500 MG/1
1 TABLET WITH A MEAL TABLET, FILM COATED ORAL ONCE A DAY
COMMUNITY

## 2022-02-22 RX ORDER — AMLODIPINE BESYLATE 5 MG/1
1 TABLET TABLET ORAL ONCE A DAY
COMMUNITY

## 2022-02-22 RX ORDER — VITAMIN A 2400 MCG
1 TABLET CAPSULE ORAL ONCE A DAY
COMMUNITY

## 2022-02-22 RX ORDER — PANTOPRAZOLE SODIUM 40 MG/1
1 TABLET TABLET, DELAYED RELEASE ORAL ONCE A DAY
COMMUNITY

## 2022-02-22 RX ORDER — PRAVASTATIN SODIUM 40 MG/1
1 TABLET TABLET ORAL ONCE A DAY
COMMUNITY

## 2022-02-22 RX ORDER — FENOFIBRATE 145 MG/1
1 TABLET TABLET, FILM COATED ORAL ONCE A DAY
COMMUNITY

## 2022-02-22 RX ORDER — AZITHROMYCIN MONOHYDRATE 500 MG/1
1 TABLET TABLET, FILM COATED ORAL ONCE A DAY
Qty: 7 TABLET | Refills: 0 | OUTPATIENT

## 2022-06-16 ENCOUNTER — OFFICE VISIT (OUTPATIENT)
Dept: URBAN - METROPOLITAN AREA TELEHEALTH 2 | Facility: TELEHEALTH | Age: 57
End: 2022-06-16

## 2022-06-16 ENCOUNTER — TELEPHONE ENCOUNTER (OUTPATIENT)
Dept: URBAN - METROPOLITAN AREA CLINIC 54 | Facility: CLINIC | Age: 57
End: 2022-06-16

## 2022-06-16 VITALS — HEIGHT: 58 IN | WEIGHT: 108 LBS | BODY MASS INDEX: 22.67 KG/M2

## 2022-06-16 RX ORDER — AMLODIPINE BESYLATE 5 MG/1
1 TABLET TABLET ORAL ONCE A DAY
Status: ACTIVE | COMMUNITY

## 2022-06-16 RX ORDER — VITAMIN A 2400 MCG
1 TABLET CAPSULE ORAL ONCE A DAY
Status: ACTIVE | COMMUNITY

## 2022-06-16 RX ORDER — PRAVASTATIN SODIUM 40 MG/1
1 TABLET TABLET ORAL ONCE A DAY
Status: ACTIVE | COMMUNITY

## 2022-06-16 RX ORDER — FENOFIBRATE 145 MG/1
1 TABLET TABLET, FILM COATED ORAL ONCE A DAY
Status: ACTIVE | COMMUNITY

## 2022-06-16 RX ORDER — METFORMIN HYDROCHLORIDE 500 MG/1
1 TABLET WITH A MEAL TABLET, FILM COATED ORAL ONCE A DAY
Status: ACTIVE | COMMUNITY

## 2022-06-16 RX ORDER — PANTOPRAZOLE SODIUM 40 MG/1
1 TABLET TABLET, DELAYED RELEASE ORAL ONCE A DAY
Status: ACTIVE | COMMUNITY

## 2022-06-16 NOTE — HPI-TODAY'S VISIT:
Patient seen today via telehealth by agreement and consent of patient. I used a telephone call during the visit. The patient encounter is appropriate and reasonable under the circumstances given the patient's particular presentation at this time. The patient has been advised of the followin) the potential risks and limitations of this mode of treatment (including but not limited to the absence of in-person examination); 2) the right to refuse telehealth services at any point without affecting the right to future care; 3) the right to receive in-person services, included immediately after this consultation if an urgent need arises; 4) information, including identifiable images or information from this telehealth consult, will only be shared in accordance with HIPPA regulations. Any and all of the patient's and/or patient's family member's questions on this issue have been answered.  22: Patient returns to day for follow up to discuss diarrhea and bloating. Still having bloating?

## 2022-06-16 NOTE — HPI-OTHER HISTORIES
1/6/22: Denies fecal incontinence. Mr. Nader Powell is a 56-year-old woman referred by Shaina Jara for chronic diarrhea. Recent CBC without anemia.   Onset: 3 years ago. Aggravating factors: Food Frequency of stools: 2-3 x/day Stool consistency: watery and solid Associate with bloating/gas Baseline bowel habits prior to onset:  once a day, solid.  Had EGD and colon in 2017 in [place] for GERD: EGD- was told she had gastritis.  Colonocopy: she thinks 1 polyp was found. They told her to come back in 5 years.  Family HIstory of GI cancer: Unknown Family History of IBD: unknown Family History of Autoimmune: unknown   Family History: Diabetes No dysphagia, Nausea, vomiting.  Endorses history of heart burn which improves with PPI. Returns when she discontinues PPI. -KJ 2/10/22: Ms. Nader Powell is a 56-year-old female who presents for follow-up for abdominal bloating and diarrhea. Patient underwent colonoscopy which was grossly unremarkable.  Random colonic biopsies were without abnormality. Patient has not yet performed stool studies.   Still having diarrhea, 4 bowel movements yesterday. Very foul smelling.  Endorses heartburn, which is somewhat improved with pantoprazole which she takes twice a day. -KJ

## 2022-07-08 ENCOUNTER — OFFICE VISIT (OUTPATIENT)
Dept: URBAN - METROPOLITAN AREA CLINIC 54 | Facility: CLINIC | Age: 57
End: 2022-07-08

## 2022-07-08 RX ORDER — AMLODIPINE BESYLATE 5 MG/1
1 TABLET TABLET ORAL ONCE A DAY
COMMUNITY

## 2022-07-08 RX ORDER — PRAVASTATIN SODIUM 40 MG/1
1 TABLET TABLET ORAL ONCE A DAY
COMMUNITY

## 2022-07-08 RX ORDER — METFORMIN HYDROCHLORIDE 500 MG/1
1 TABLET WITH A MEAL TABLET, FILM COATED ORAL ONCE A DAY
COMMUNITY

## 2022-07-08 RX ORDER — FENOFIBRATE 145 MG/1
1 TABLET TABLET, FILM COATED ORAL ONCE A DAY
COMMUNITY

## 2022-07-08 RX ORDER — PANTOPRAZOLE SODIUM 40 MG/1
1 TABLET TABLET, DELAYED RELEASE ORAL ONCE A DAY
COMMUNITY

## 2022-07-08 RX ORDER — VITAMIN A 2400 MCG
1 TABLET CAPSULE ORAL ONCE A DAY
COMMUNITY

## 2022-07-28 NOTE — ED PROVIDER NOTE - QRS
History continued:  # Nausea and vomiting - unclear etiology. Reportedly with no improvement s/p stent placement with a previously demonstrated patent stent while symptomatic. Clinically not obstructed. DDx includes partially obstructed stent vs. partially accommodating stomach in the setting of ?gastric mass. Previously planned for surgical exploratory laparotomy, creation of gastrojejunostomy, placement of feeding jejunostomy  # Dilated CBD - with noted uptrending bilirubin and liver enzymes, suspicion for CBD obstruction, possibly in the setting of ?partially obstructed duodenal stent vs. stones/sludge/malignancy. Clinically no signs of cholangitis.  # Gastric mass? - EGD with extrinsic compression? Previously planned for exploratory laparoscopy  # Reported PE - recent diagnosis per daughter, CAD, Goiter, HFpEF, Retroperitoneal fiborsis, Endometrial thickening  GI Impression: 1.  Gastric outlet obstruction s/p duodenal stent, with recurrent n/v.  Stent in place and patent by recent upper GI series. 2.  Abnormal LFTs with worsening hyperbilirubinemia 3.  CAD s/p stent, diastolic CHF 4.  Large thyroid mass  Recommendations: 1.  Downgrade diet from pureed to full liquids with liquid nutritional supplements and calorie count 2.  Follow LFTs 3.  Surgery followup for continued consideration of gastrojejunostomy 4.  Palliative care consultation.   Surgery consult for gastric outlet obstruction-->Recommendations: No surgical intervention at this time, Consider NGT if nauseous/vomiting/retching  - Nutritional assessment/consult  Infectious disease consult for UTI, hydronephrosis-->* no fever or WBC, UTI vs asymptomatic pyuria  * f/u the urine cx, on ceftriaxone for now but if urine cx negative will discontinue  * GOB management as per GI and surgery, plan for esophagogram    SWALLOW HISTORY: No reports in SCM or in PACS prior to this admission. 64

## 2022-08-02 ENCOUNTER — OFFICE VISIT (OUTPATIENT)
Dept: URBAN - METROPOLITAN AREA CLINIC 54 | Facility: CLINIC | Age: 57
End: 2022-08-02

## 2022-08-02 RX ORDER — VITAMIN A 2400 MCG
1 TABLET CAPSULE ORAL ONCE A DAY
COMMUNITY

## 2022-08-02 RX ORDER — AMLODIPINE BESYLATE 5 MG/1
1 TABLET TABLET ORAL ONCE A DAY
COMMUNITY

## 2022-08-02 RX ORDER — METFORMIN HYDROCHLORIDE 500 MG/1
1 TABLET WITH A MEAL TABLET, FILM COATED ORAL ONCE A DAY
COMMUNITY

## 2022-08-02 RX ORDER — PRAVASTATIN SODIUM 40 MG/1
1 TABLET TABLET ORAL ONCE A DAY
COMMUNITY

## 2022-08-02 RX ORDER — FENOFIBRATE 145 MG/1
1 TABLET TABLET, FILM COATED ORAL ONCE A DAY
COMMUNITY

## 2022-08-02 RX ORDER — AZITHROMYCIN MONOHYDRATE 500 MG/1
1 TABLET TABLET, FILM COATED ORAL ONCE A DAY
Qty: 7 TABLET | Refills: 0 | COMMUNITY

## 2022-08-02 RX ORDER — PANTOPRAZOLE SODIUM 40 MG/1
1 TABLET TABLET, DELAYED RELEASE ORAL ONCE A DAY
COMMUNITY

## 2022-08-19 ENCOUNTER — OFFICE VISIT (OUTPATIENT)
Dept: URBAN - METROPOLITAN AREA CLINIC 54 | Facility: CLINIC | Age: 57
End: 2022-08-19

## 2022-08-24 ENCOUNTER — OFFICE VISIT (OUTPATIENT)
Dept: URBAN - METROPOLITAN AREA CLINIC 54 | Facility: CLINIC | Age: 57
End: 2022-08-24

## 2022-08-25 ENCOUNTER — OFFICE VISIT (OUTPATIENT)
Dept: URBAN - METROPOLITAN AREA CLINIC 54 | Facility: CLINIC | Age: 57
End: 2022-08-25
Payer: COMMERCIAL

## 2022-08-25 VITALS
SYSTOLIC BLOOD PRESSURE: 138 MMHG | BODY MASS INDEX: 22.88 KG/M2 | TEMPERATURE: 98.1 F | DIASTOLIC BLOOD PRESSURE: 88 MMHG | HEART RATE: 87 BPM | HEIGHT: 58 IN | WEIGHT: 109 LBS

## 2022-08-25 DIAGNOSIS — R10.9 ABDOMINAL CRAMPING: ICD-10-CM

## 2022-08-25 PROCEDURE — 99213 OFFICE O/P EST LOW 20 MIN: CPT | Performed by: STUDENT IN AN ORGANIZED HEALTH CARE EDUCATION/TRAINING PROGRAM

## 2022-08-25 RX ORDER — VITAMIN A 2400 MCG
1 TABLET CAPSULE ORAL ONCE A DAY
Status: ACTIVE | COMMUNITY

## 2022-08-25 RX ORDER — PRAVASTATIN SODIUM 40 MG/1
1 TABLET TABLET ORAL ONCE A DAY
Status: ACTIVE | COMMUNITY

## 2022-08-25 RX ORDER — AMLODIPINE BESYLATE 5 MG/1
1 TABLET TABLET ORAL ONCE A DAY
Status: ACTIVE | COMMUNITY

## 2022-08-25 RX ORDER — PANTOPRAZOLE SODIUM 40 MG/1
1 TABLET TABLET, DELAYED RELEASE ORAL ONCE A DAY
Status: ACTIVE | COMMUNITY

## 2022-08-25 RX ORDER — DICYCLOMINE HYDROCHLORIDE 20 MG/1
1 TABLET TABLET ORAL TWICE A DAY
Qty: 60 TABLET | Refills: 2 | OUTPATIENT
Start: 2022-08-25 | End: 2022-11-22

## 2022-08-25 RX ORDER — AZITHROMYCIN MONOHYDRATE 500 MG/1
1 TABLET TABLET, FILM COATED ORAL ONCE A DAY
Qty: 7 TABLET | Refills: 0 | Status: ACTIVE | COMMUNITY

## 2022-08-25 RX ORDER — METFORMIN HYDROCHLORIDE 500 MG/1
1 TABLET WITH A MEAL TABLET, FILM COATED ORAL ONCE A DAY
Status: ACTIVE | COMMUNITY

## 2022-08-25 NOTE — HPI-TODAY'S VISIT:
Patient returns for follow-up.  She states she still has abdominal pain.  This is in her right lower quadrant she describes it as bloating and "twisty".  She experiences this pain daily.  This abdominal discomfort began with the onset of her diarrhea.  Patient now states that she only has diarrhea sometimes.  She has usually 1 bowel movement daily.

## 2022-08-25 NOTE — HPI-OTHER HISTORIES
22: Denies fecal incontinence. Mr. Nader Powell is a 56-year-old woman referred by Shaina Jara for chronic diarrhea. Recent CBC without anemia.   Onset: 3 years ago. Aggravating factors: Food Frequency of stools: 2-3 x/day Stool consistency: watery and solid Associate with bloating/gas Baseline bowel habits prior to onset:  once a day, solid.  Had EGD and colon in 2017 in [place] for GERD: EGD- was told she had gastritis.  Colonocopy: she thinks 1 polyp was found. They told her to come back in 5 years.  Family HIstory of GI cancer: Unknown Family History of IBD: unknown Family History of Autoimmune: unknown   Family History: Diabetes No dysphagia, Nausea, vomiting.  Endorses history of heart burn which improves with PPI. Returns when she discontinues PPI. -KJ 2/10/22: Ms. Nader Powell is a 56-year-old female who presents for follow-up for abdominal bloating and diarrhea. Patient underwent colonoscopy which was grossly unremarkable.  Random colonic biopsies were without abnormality. Patient has not yet performed stool studies.   Still having diarrhea, 4 bowel movements yesterday. Very foul smelling.  Endorses heartburn, which is somewhat improved with pantoprazole which she takes twice a day. -KJ  22 Patient seen today via telehealth by agreement and consent of patient. I used a telephone call during the visit. The patient encounter is appropriate and reasonable under the circumstances given the patient's particular presentation at this time. The patient has been advised of the followin) the potential risks and limitations of this mode of treatment (including but not limited to the absence of in-person examination); 2) the right to refuse telehealth services at any point without affecting the right to future care; 3) the right to receive in-person services, included immediately after this consultation if an urgent need arises; 4) information, including identifiable images or information from this telehealth consult, will only be shared in accordance with HIPPA regulations. Any and all of the patient's and/or patient's family member's questions on this issue have been answered.

## 2022-08-25 NOTE — EXAM-PHYSICAL EXAM
General: Well-appearing. No acute distress. HEENT: Anicteric sclerae Cardiovascular: Normal heart rate Respiratory: Breathing comfortably without conversational dyspnea Abdomen:  non-distended, soft, right lower quadrant tenderness Rectal: Deferred Skin: without visible rashes on examined areas. Musculoskeletal: ambulated without difficulty. Can stand from sitting position without assistance. Neuro: No gross focal deficits. Alert and oriented. Psych: Appropriate mood and affect.

## 2022-09-23 ENCOUNTER — OFFICE VISIT (OUTPATIENT)
Dept: URBAN - METROPOLITAN AREA CLINIC 54 | Facility: CLINIC | Age: 57
End: 2022-09-23

## 2022-09-23 RX ORDER — METFORMIN HYDROCHLORIDE 500 MG/1
1 TABLET WITH A MEAL TABLET, FILM COATED ORAL ONCE A DAY
COMMUNITY

## 2022-09-23 RX ORDER — PRAVASTATIN SODIUM 40 MG/1
1 TABLET TABLET ORAL ONCE A DAY
COMMUNITY

## 2022-09-23 RX ORDER — AMLODIPINE BESYLATE 5 MG/1
1 TABLET TABLET ORAL ONCE A DAY
COMMUNITY

## 2022-09-23 RX ORDER — DICYCLOMINE HYDROCHLORIDE 20 MG/1
1 TABLET TABLET ORAL TWICE A DAY
Qty: 60 TABLET | Refills: 2 | COMMUNITY
Start: 2022-08-25 | End: 2022-11-22

## 2022-09-23 RX ORDER — PANTOPRAZOLE SODIUM 40 MG/1
1 TABLET TABLET, DELAYED RELEASE ORAL ONCE A DAY
COMMUNITY

## 2022-09-23 RX ORDER — VITAMIN A 2400 MCG
1 TABLET CAPSULE ORAL ONCE A DAY
COMMUNITY

## 2022-10-20 ENCOUNTER — OFFICE VISIT (OUTPATIENT)
Dept: URBAN - METROPOLITAN AREA CLINIC 54 | Facility: CLINIC | Age: 57
End: 2022-10-20
Payer: COMMERCIAL

## 2022-10-20 ENCOUNTER — OFFICE VISIT (OUTPATIENT)
Dept: URBAN - METROPOLITAN AREA CLINIC 54 | Facility: CLINIC | Age: 57
End: 2022-10-20

## 2022-10-20 VITALS
BODY MASS INDEX: 22.67 KG/M2 | TEMPERATURE: 97.4 F | SYSTOLIC BLOOD PRESSURE: 121 MMHG | HEIGHT: 58 IN | DIASTOLIC BLOOD PRESSURE: 82 MMHG | WEIGHT: 108 LBS | HEART RATE: 90 BPM

## 2022-10-20 DIAGNOSIS — R19.5 WATERY STOOLS: ICD-10-CM

## 2022-10-20 DIAGNOSIS — R14.0 ABDOMINAL BLOATING: ICD-10-CM

## 2022-10-20 DIAGNOSIS — R10.31 RLQ ABDOMINAL PAIN: ICD-10-CM

## 2022-10-20 PROCEDURE — 99213 OFFICE O/P EST LOW 20 MIN: CPT | Performed by: STUDENT IN AN ORGANIZED HEALTH CARE EDUCATION/TRAINING PROGRAM

## 2022-10-20 RX ORDER — PRAVASTATIN SODIUM 40 MG/1
1 TABLET TABLET ORAL ONCE A DAY
Status: ACTIVE | COMMUNITY

## 2022-10-20 RX ORDER — DICYCLOMINE HYDROCHLORIDE 20 MG/1
1 TABLET TABLET ORAL TWICE A DAY
Qty: 60 TABLET | Refills: 2 | Status: ACTIVE | COMMUNITY
Start: 2022-08-25 | End: 2022-11-22

## 2022-10-20 RX ORDER — METFORMIN HYDROCHLORIDE 500 MG/1
1 TABLET WITH A MEAL TABLET, FILM COATED ORAL ONCE A DAY
Status: ACTIVE | COMMUNITY

## 2022-10-20 RX ORDER — AMLODIPINE BESYLATE 5 MG/1
1 TABLET TABLET ORAL ONCE A DAY
Status: ACTIVE | COMMUNITY

## 2022-10-20 RX ORDER — PANTOPRAZOLE SODIUM 40 MG/1
1 TABLET TABLET, DELAYED RELEASE ORAL ONCE A DAY
Status: ACTIVE | COMMUNITY

## 2022-10-20 RX ORDER — VITAMIN A 2400 MCG
1 TABLET CAPSULE ORAL ONCE A DAY
Status: ACTIVE | COMMUNITY

## 2022-10-20 NOTE — EXAM-PHYSICAL EXAM
General: Well appearing. No acute distress. HEENT: Anicteric sclerae Cardiovascular: Normal heart rate Respiratory: Breathing comfortably without conversational dyspnea Abdomen:  non-distended, soft, mild right-sided abdominal tenderness most severe in right lower quadrant. Rectal: Deferred Skin: without visible rashes on examined areas. Musculoskeletal: ambulated without difficulty. Can stand from sitting position without assistance. Neuro: No gross focal deficits. Alert and oriented. Psych: Appropriate mood and affect.

## 2022-10-20 NOTE — HPI-OTHER HISTORIES
1/6/22: Denies fecal incontinence. Mr. Nader Powell is a 56-year-old woman referred by Shaina Jara for chronic diarrhea. Recent CBC without anemia.   Onset: 3 years ago. Aggravating factors: Food Frequency of stools: 2-3 x/day Stool consistency: watery and solid Associate with bloating/gas Baseline bowel habits prior to onset:  once a day, solid.  Had EGD and colon in 2017 in [place] for GERD: EGD- was told she had gastritis.  Colonocopy: she thinks 1 polyp was found. They told her to come back in 5 years.  Family HIstory of GI cancer: Unknown Family History of IBD: unknown Family History of Autoimmune: unknown   Family History: Diabetes No dysphagia, Nausea, vomiting.  Endorses history of heart burn which improves with PPI. Returns when she discontinues PPI. -KJ 2/10/22: Ms. Nader Powell is a 56-year-old female who presents for follow-up for abdominal bloating and diarrhea. Patient underwent colonoscopy which was grossly unremarkable.  Random colonic biopsies were without abnormality. Patient has not yet performed stool studies.   Still having diarrhea, 4 bowel movements yesterday. Very foul smelling.  Endorses heartburn, which is somewhat improved with pantoprazole which she takes twice a day. -KJ  8/25/2022 Patient returns for follow-up.  She states she still has abdominal pain.  This is in her right lower quadrant she describes it as bloating and "twisty".  She experiences this pain daily.  This abdominal discomfort began with the onset of her diarrhea.  Patient now states that she only has diarrhea sometimes.  She has usually 1 bowel movement daily.

## 2022-10-20 NOTE — HPI-TODAY'S VISIT:
Patient states today that her symptoms of not improved.  She still has 1-2 watery bowel movements a day and associated right lower quadrant abdominal pain and abdominal bloating.  When asked if she completed azithromycin therapy for her previous diarrheal infection.  She states that she thought she took the antibiotic for 1 month (of note, it was prescribed for 7 days).  Unclear if patient has been taking dicyclomine.   Patient denies any fevers or chills.  She denies any bloody bowel movements.  She states that she has had diarrhea similarly in after eating chicken during a trip to [place].  She states she works as a .

## 2022-10-21 ENCOUNTER — TELEPHONE ENCOUNTER (OUTPATIENT)
Dept: URBAN - METROPOLITAN AREA CLINIC 54 | Facility: CLINIC | Age: 57
End: 2022-10-21

## 2022-10-21 RX ORDER — DICYCLOMINE HYDROCHLORIDE 20 MG/1
1 TABLET TABLET ORAL TWICE A DAY
Qty: 60 TABLET | Refills: 2 | OUTPATIENT
Start: 2022-08-25 | End: 2023-01-18

## 2022-10-25 LAB
CAMPYLOBACTER GROUP: NOT DETECTED
CAMPYLOBACTER GROUP: NOT DETECTED
NOROVIRUS GI/GII: NOT DETECTED
NOROVIRUS GI/GII: NOT DETECTED
ROTAVIRUS A: NOT DETECTED
ROTAVIRUS A: NOT DETECTED
SALMONELLA SPECIES: NOT DETECTED
SALMONELLA SPECIES: NOT DETECTED
SHIGA TOXIN 1: NOT DETECTED
SHIGA TOXIN 1: NOT DETECTED
SHIGA TOXIN 2: NOT DETECTED
SHIGA TOXIN 2: NOT DETECTED
SHIGELLA SPECIES: NOT DETECTED
SHIGELLA SPECIES: NOT DETECTED
VIBRIO GROUP: NOT DETECTED
VIBRIO GROUP: NOT DETECTED
YERSINIA ENTEROCOLITICA: NOT DETECTED
YERSINIA ENTEROCOLITICA: NOT DETECTED

## 2022-12-01 ENCOUNTER — DASHBOARD ENCOUNTERS (OUTPATIENT)
Age: 57
End: 2022-12-01

## 2022-12-01 ENCOUNTER — OFFICE VISIT (OUTPATIENT)
Dept: URBAN - METROPOLITAN AREA CLINIC 54 | Facility: CLINIC | Age: 57
End: 2022-12-01
Payer: COMMERCIAL

## 2022-12-01 VITALS
BODY MASS INDEX: 22.67 KG/M2 | HEIGHT: 58 IN | HEART RATE: 77 BPM | TEMPERATURE: 97.6 F | WEIGHT: 108 LBS | SYSTOLIC BLOOD PRESSURE: 147 MMHG | DIASTOLIC BLOOD PRESSURE: 79 MMHG

## 2022-12-01 DIAGNOSIS — K58.0 IRRITABLE BOWEL SYNDROME WITH DIARRHEA: ICD-10-CM

## 2022-12-01 DIAGNOSIS — Z86.010 PERSONAL HISTORY OF COLONIC POLYPS: ICD-10-CM

## 2022-12-01 DIAGNOSIS — K21.9 GERD: ICD-10-CM

## 2022-12-01 PROBLEM — 235595009 GASTROESOPHAGEAL REFLUX DISEASE: Status: ACTIVE | Noted: 2022-12-01

## 2022-12-01 PROBLEM — 428283002 HISTORY OF POLYP OF COLON (SITUATION): Status: ACTIVE | Noted: 2022-12-01

## 2022-12-01 PROBLEM — 197125005 IRRITABLE BOWEL SYNDROME WITH DIARRHEA: Status: ACTIVE | Noted: 2022-12-01

## 2022-12-01 PROCEDURE — 99213 OFFICE O/P EST LOW 20 MIN: CPT | Performed by: STUDENT IN AN ORGANIZED HEALTH CARE EDUCATION/TRAINING PROGRAM

## 2022-12-01 RX ORDER — POLYETHYLENE GLYCOL-3350 AND ELECTROLYTES WITH FLAVOR PACK 240; 5.84; 2.98; 6.72; 22.72 G/278.26G; G/278.26G; G/278.26G; G/278.26G; G/278.26G
AS DIRECTED POWDER, FOR SOLUTION ORAL AS DIRECTED
Qty: 1 PACKAGE | Refills: 0 | OUTPATIENT
Start: 2022-12-01 | End: 2022-12-03

## 2022-12-01 RX ORDER — DICYCLOMINE HYDROCHLORIDE 20 MG/1
1 TABLET TABLET ORAL TWICE A DAY
Qty: 60 TABLET | Refills: 2 | Status: ACTIVE | COMMUNITY
Start: 2022-08-25 | End: 2023-01-18

## 2022-12-01 RX ORDER — PRAVASTATIN SODIUM 40 MG/1
1 TABLET TABLET ORAL ONCE A DAY
Status: ACTIVE | COMMUNITY

## 2022-12-01 RX ORDER — METFORMIN HYDROCHLORIDE 500 MG/1
1 TABLET WITH A MEAL TABLET, FILM COATED ORAL ONCE A DAY
Status: ACTIVE | COMMUNITY

## 2022-12-01 RX ORDER — VITAMIN A 2400 MCG
1 TABLET CAPSULE ORAL ONCE A DAY
Status: ACTIVE | COMMUNITY

## 2022-12-01 NOTE — HPI-TODAY'S VISIT:
Patient states that her symptoms have improved somewhat. Diarrhea is now resolved. She now only has one solid stool a day.  She states the dicyclomine is working very well.    Her gastrointestinal profile lab was negative for infection.

## 2022-12-01 NOTE — HPI-OTHER HISTORIES
1/6/22: Denies fecal incontinence. Mr. Nader Powell is a 56-year-old woman referred by Shaina Jara for chronic diarrhea. Recent CBC without anemia.   Onset: 3 years ago. Aggravating factors: Food Frequency of stools: 2-3 x/day Stool consistency: watery and solid Associate with bloating/gas Baseline bowel habits prior to onset:  once a day, solid.  Had EGD and colon in 2017 in [place] for GERD: EGD- was told she had gastritis.  Colonocopy: she thinks 1 polyp was found. They told her to come back in 5 years.  Family HIstory of GI cancer: Unknown Family History of IBD: unknown Family History of Autoimmune: unknown   Family History: Diabetes No dysphagia, Nausea, vomiting.  Endorses history of heart burn which improves with PPI. Returns when she discontinues PPI. -KJ 2/10/22: Ms. Nader Powell is a 56-year-old female who presents for follow-up for abdominal bloating and diarrhea. Patient underwent colonoscopy which was grossly unremarkable.  Random colonic biopsies were without abnormality. Patient has not yet performed stool studies.   Still having diarrhea, 4 bowel movements yesterday. Very foul smelling.  Endorses heartburn, which is somewhat improved with pantoprazole which she takes twice a day. -KJ  8/25/2022 Patient returns for follow-up.  She states she still has abdominal pain.  This is in her right lower quadrant she describes it as bloating and "twisty".  She experiences this pain daily.  This abdominal discomfort began with the onset of her diarrhea.  Patient now states that she only has diarrhea sometimes.  She has usually 1 bowel movement daily.  10/20/22: Patient states today that her symptoms of not improved.  She still has 1-2 watery bowel movements a day and associated right lower quadrant abdominal pain and abdominal bloating.  When asked if she completed azithromycin therapy for her previous diarrheal infection.  She states that she thought she took the antibiotic for 1 month (of note, it was prescribed for 7 days).  Unclear if patient has been taking dicyclomine.   Patient denies any fevers or chills.  She denies any bloody bowel movements.  She states that she has had diarrhea similarly in after eating chicken during a trip to [place].  She states she works as a .

## 2022-12-02 PROBLEM — 428283002: Status: ACTIVE | Noted: 2022-01-06

## 2022-12-19 ENCOUNTER — TELEPHONE ENCOUNTER (OUTPATIENT)
Dept: URBAN - METROPOLITAN AREA CLINIC 54 | Facility: CLINIC | Age: 57
End: 2022-12-19

## 2022-12-21 NOTE — ED ADULT NURSE NOTE - TEMPLATE
Patient requests all Lab, Cardiology, and Radiology Results on their Discharge Instructions
Cold/Sinus

## 2022-12-22 ENCOUNTER — TELEPHONE ENCOUNTER (OUTPATIENT)
Dept: URBAN - METROPOLITAN AREA CLINIC 54 | Facility: CLINIC | Age: 57
End: 2022-12-22

## 2022-12-22 ENCOUNTER — CLAIMS CREATED FROM THE CLAIM WINDOW (OUTPATIENT)
Dept: URBAN - METROPOLITAN AREA SURGERY CENTER 14 | Facility: SURGERY CENTER | Age: 57
End: 2022-12-22

## 2022-12-22 ENCOUNTER — CLAIMS CREATED FROM THE CLAIM WINDOW (OUTPATIENT)
Dept: URBAN - METROPOLITAN AREA SURGERY CENTER 14 | Facility: SURGERY CENTER | Age: 57
End: 2022-12-22
Payer: COMMERCIAL

## 2022-12-22 DIAGNOSIS — Z86.010 ADENOMAS PERSONAL HISTORY OF COLONIC POLYPS: ICD-10-CM

## 2022-12-22 PROCEDURE — G8907 PT DOC NO EVENTS ON DISCHARG: HCPCS | Performed by: STUDENT IN AN ORGANIZED HEALTH CARE EDUCATION/TRAINING PROGRAM

## 2022-12-22 PROCEDURE — G0105 COLORECTAL SCRN; HI RISK IND: HCPCS | Performed by: STUDENT IN AN ORGANIZED HEALTH CARE EDUCATION/TRAINING PROGRAM

## 2022-12-22 RX ORDER — VITAMIN A 2400 MCG
1 TABLET CAPSULE ORAL ONCE A DAY
Status: ACTIVE | COMMUNITY

## 2022-12-22 RX ORDER — DICYCLOMINE HYDROCHLORIDE 20 MG/1
1 TABLET TABLET ORAL TWICE A DAY
Qty: 60 TABLET | Refills: 2 | Status: ACTIVE | COMMUNITY
Start: 2022-08-25 | End: 2023-01-18

## 2022-12-22 RX ORDER — PRAVASTATIN SODIUM 40 MG/1
1 TABLET TABLET ORAL ONCE A DAY
Status: ACTIVE | COMMUNITY

## 2022-12-22 RX ORDER — METFORMIN HYDROCHLORIDE 500 MG/1
1 TABLET WITH A MEAL TABLET, FILM COATED ORAL ONCE A DAY
Status: ACTIVE | COMMUNITY

## 2023-02-01 ENCOUNTER — TELEPHONE ENCOUNTER (OUTPATIENT)
Dept: URBAN - METROPOLITAN AREA CLINIC 54 | Facility: CLINIC | Age: 58
End: 2023-02-01

## 2023-06-26 ENCOUNTER — TELEPHONE ENCOUNTER (OUTPATIENT)
Dept: URBAN - METROPOLITAN AREA CLINIC 54 | Facility: CLINIC | Age: 58
End: 2023-06-26

## 2025-01-16 ENCOUNTER — TELEPHONE ENCOUNTER (OUTPATIENT)
Dept: URBAN - METROPOLITAN AREA CLINIC 54 | Facility: CLINIC | Age: 60
End: 2025-01-16